# Patient Record
Sex: MALE | Race: WHITE | Employment: FULL TIME | URBAN - METROPOLITAN AREA
[De-identification: names, ages, dates, MRNs, and addresses within clinical notes are randomized per-mention and may not be internally consistent; named-entity substitution may affect disease eponyms.]

---

## 2018-10-19 ENCOUNTER — HOSPITAL ENCOUNTER (EMERGENCY)
Age: 23
Discharge: ANOTHER HOSPITAL | DRG: 430 | End: 2018-10-19
Attending: EMERGENCY MEDICINE | Admitting: PSYCHIATRY & NEUROLOGY
Payer: COMMERCIAL

## 2018-10-19 ENCOUNTER — HOSPITAL ENCOUNTER (INPATIENT)
Age: 23
LOS: 6 days | Discharge: HOME | DRG: 430 | End: 2018-10-25
Attending: PSYCHIATRY & NEUROLOGY | Admitting: PSYCHIATRY & NEUROLOGY
Payer: COMMERCIAL

## 2018-10-19 VITALS
OXYGEN SATURATION: 98 % | HEIGHT: 72 IN | SYSTOLIC BLOOD PRESSURE: 135 MMHG | TEMPERATURE: 98.5 F | HEART RATE: 82 BPM | RESPIRATION RATE: 12 BRPM | BODY MASS INDEX: 20.32 KG/M2 | DIASTOLIC BLOOD PRESSURE: 93 MMHG | WEIGHT: 150 LBS

## 2018-10-19 DIAGNOSIS — F31.13 BIPOLAR DISORDER WITH SEVERE MANIA (HCC): Primary | ICD-10-CM

## 2018-10-19 PROBLEM — F31.9 BIPOLAR 1 DISORDER (HCC): Status: ACTIVE | Noted: 2018-10-19

## 2018-10-19 LAB
ALBUMIN SERPL-MCNC: 4.7 G/DL (ref 3.5–5)
ALBUMIN/GLOB SERPL: 1.4 {RATIO} (ref 1.1–2.2)
ALP SERPL-CCNC: 74 U/L (ref 45–117)
ALT SERPL-CCNC: 18 U/L (ref 12–78)
AMPHET UR QL SCN: NEGATIVE
ANION GAP SERPL CALC-SCNC: 11 MMOL/L (ref 5–15)
APPEARANCE UR: CLEAR
AST SERPL-CCNC: 17 U/L (ref 15–37)
BACTERIA URNS QL MICRO: NEGATIVE /HPF
BARBITURATES UR QL SCN: NEGATIVE
BASOPHILS # BLD: 0 K/UL (ref 0–0.1)
BASOPHILS NFR BLD: 0 % (ref 0–1)
BENZODIAZ UR QL: NEGATIVE
BILIRUB SERPL-MCNC: 1.8 MG/DL (ref 0.2–1)
BILIRUB UR QL: NEGATIVE
BUN SERPL-MCNC: 11 MG/DL (ref 6–20)
BUN/CREAT SERPL: 9 (ref 12–20)
CALCIUM SERPL-MCNC: 9.3 MG/DL (ref 8.5–10.1)
CANNABINOIDS UR QL SCN: NEGATIVE
CHLORIDE SERPL-SCNC: 105 MMOL/L (ref 97–108)
CO2 SERPL-SCNC: 23 MMOL/L (ref 21–32)
COCAINE UR QL SCN: NEGATIVE
COLOR UR: NORMAL
CREAT SERPL-MCNC: 1.26 MG/DL (ref 0.7–1.3)
DIFFERENTIAL METHOD BLD: ABNORMAL
DRUG SCRN COMMENT,DRGCM: NORMAL
EOSINOPHIL # BLD: 0.1 K/UL (ref 0–0.4)
EOSINOPHIL NFR BLD: 1 % (ref 0–7)
EPITH CASTS URNS QL MICRO: NORMAL /LPF
ERYTHROCYTE [DISTWIDTH] IN BLOOD BY AUTOMATED COUNT: 12.8 % (ref 11.5–14.5)
ETHANOL SERPL-MCNC: <10 MG/DL
GLOBULIN SER CALC-MCNC: 3.4 G/DL (ref 2–4)
GLUCOSE SERPL-MCNC: 124 MG/DL (ref 65–100)
GLUCOSE UR STRIP.AUTO-MCNC: NEGATIVE MG/DL
HCT VFR BLD AUTO: 45.7 % (ref 36.6–50.3)
HGB BLD-MCNC: 16.5 G/DL (ref 12.1–17)
HGB UR QL STRIP: NEGATIVE
HYALINE CASTS URNS QL MICRO: NORMAL /LPF (ref 0–5)
IMM GRANULOCYTES # BLD: 0 K/UL (ref 0–0.04)
IMM GRANULOCYTES NFR BLD AUTO: 0 % (ref 0–0.5)
KETONES UR QL STRIP.AUTO: NEGATIVE MG/DL
LEUKOCYTE ESTERASE UR QL STRIP.AUTO: NEGATIVE
LYMPHOCYTES # BLD: 1.6 K/UL (ref 0.8–3.5)
LYMPHOCYTES NFR BLD: 21 % (ref 12–49)
MCH RBC QN AUTO: 28.2 PG (ref 26–34)
MCHC RBC AUTO-ENTMCNC: 36.1 G/DL (ref 30–36.5)
MCV RBC AUTO: 78.1 FL (ref 80–99)
METHADONE UR QL: NEGATIVE
MONOCYTES # BLD: 0.7 K/UL (ref 0–1)
MONOCYTES NFR BLD: 9 % (ref 5–13)
NEUTS SEG # BLD: 5.3 K/UL (ref 1.8–8)
NEUTS SEG NFR BLD: 68 % (ref 32–75)
NITRITE UR QL STRIP.AUTO: NEGATIVE
NRBC # BLD: 0 K/UL (ref 0–0.01)
NRBC BLD-RTO: 0 PER 100 WBC
OPIATES UR QL: NEGATIVE
PCP UR QL: NEGATIVE
PH UR STRIP: 6 [PH] (ref 5–8)
PLATELET # BLD AUTO: 302 K/UL (ref 150–400)
PMV BLD AUTO: 10.2 FL (ref 8.9–12.9)
POTASSIUM SERPL-SCNC: 3.3 MMOL/L (ref 3.5–5.1)
PROT SERPL-MCNC: 8.1 G/DL (ref 6.4–8.2)
PROT UR STRIP-MCNC: NEGATIVE MG/DL
RBC # BLD AUTO: 5.85 M/UL (ref 4.1–5.7)
RBC #/AREA URNS HPF: NORMAL /HPF (ref 0–5)
SODIUM SERPL-SCNC: 139 MMOL/L (ref 136–145)
SP GR UR REFRACTOMETRY: 1.01 (ref 1–1.03)
UA: UC IF INDICATED,UAUC: NORMAL
UROBILINOGEN UR QL STRIP.AUTO: 1 EU/DL (ref 0.2–1)
WBC # BLD AUTO: 7.7 K/UL (ref 4.1–11.1)
WBC URNS QL MICRO: NORMAL /HPF (ref 0–4)

## 2018-10-19 PROCEDURE — 36415 COLL VENOUS BLD VENIPUNCTURE: CPT | Performed by: EMERGENCY MEDICINE

## 2018-10-19 PROCEDURE — 96372 THER/PROPH/DIAG INJ SC/IM: CPT

## 2018-10-19 PROCEDURE — 80307 DRUG TEST PRSMV CHEM ANLYZR: CPT | Performed by: EMERGENCY MEDICINE

## 2018-10-19 PROCEDURE — 81001 URINALYSIS AUTO W/SCOPE: CPT | Performed by: EMERGENCY MEDICINE

## 2018-10-19 PROCEDURE — 85025 COMPLETE CBC W/AUTO DIFF WBC: CPT | Performed by: EMERGENCY MEDICINE

## 2018-10-19 PROCEDURE — 65270000029 HC RM PRIVATE

## 2018-10-19 PROCEDURE — 65220000003 HC RM SEMIPRIVATE PSYCH

## 2018-10-19 PROCEDURE — 74011250636 HC RX REV CODE- 250/636: Performed by: EMERGENCY MEDICINE

## 2018-10-19 PROCEDURE — 99284 EMERGENCY DEPT VISIT MOD MDM: CPT

## 2018-10-19 PROCEDURE — 80053 COMPREHEN METABOLIC PANEL: CPT | Performed by: EMERGENCY MEDICINE

## 2018-10-19 PROCEDURE — 74011250637 HC RX REV CODE- 250/637: Performed by: PSYCHIATRY & NEUROLOGY

## 2018-10-19 RX ORDER — LORAZEPAM 2 MG/ML
2 INJECTION INTRAMUSCULAR
Status: CANCELLED | OUTPATIENT
Start: 2018-10-19

## 2018-10-19 RX ORDER — OLANZAPINE 5 MG/1
5 TABLET ORAL
Status: CANCELLED | OUTPATIENT
Start: 2018-10-19

## 2018-10-19 RX ORDER — IBUPROFEN 200 MG
1 TABLET ORAL
Status: CANCELLED | OUTPATIENT
Start: 2018-10-19

## 2018-10-19 RX ORDER — IBUPROFEN 400 MG/1
400 TABLET ORAL
Status: DISCONTINUED | OUTPATIENT
Start: 2018-10-19 | End: 2018-10-25 | Stop reason: HOSPADM

## 2018-10-19 RX ORDER — LORAZEPAM 1 MG/1
1 TABLET ORAL
Status: CANCELLED | OUTPATIENT
Start: 2018-10-19

## 2018-10-19 RX ORDER — OLANZAPINE 5 MG/1
5 TABLET ORAL
Status: DISCONTINUED | OUTPATIENT
Start: 2018-10-19 | End: 2018-10-25 | Stop reason: HOSPADM

## 2018-10-19 RX ORDER — ZOLPIDEM TARTRATE 10 MG/1
10 TABLET ORAL
Status: CANCELLED | OUTPATIENT
Start: 2018-10-19

## 2018-10-19 RX ORDER — ADHESIVE BANDAGE
30 BANDAGE TOPICAL DAILY PRN
Status: CANCELLED | OUTPATIENT
Start: 2018-10-19

## 2018-10-19 RX ORDER — BENZTROPINE MESYLATE 1 MG/ML
2 INJECTION INTRAMUSCULAR; INTRAVENOUS
Status: DISCONTINUED | OUTPATIENT
Start: 2018-10-19 | End: 2018-10-25 | Stop reason: HOSPADM

## 2018-10-19 RX ORDER — HALOPERIDOL 5 MG/ML
5 INJECTION INTRAMUSCULAR
Status: COMPLETED | OUTPATIENT
Start: 2018-10-19 | End: 2018-10-19

## 2018-10-19 RX ORDER — LORAZEPAM 2 MG/ML
2 INJECTION INTRAMUSCULAR
Status: DISCONTINUED | OUTPATIENT
Start: 2018-10-19 | End: 2018-10-25 | Stop reason: HOSPADM

## 2018-10-19 RX ORDER — LORAZEPAM 1 MG/1
1 TABLET ORAL
Status: DISCONTINUED | OUTPATIENT
Start: 2018-10-19 | End: 2018-10-25 | Stop reason: HOSPADM

## 2018-10-19 RX ORDER — ACETAMINOPHEN 325 MG/1
650 TABLET ORAL
Status: DISCONTINUED | OUTPATIENT
Start: 2018-10-19 | End: 2018-10-25 | Stop reason: HOSPADM

## 2018-10-19 RX ORDER — ZOLPIDEM TARTRATE 10 MG/1
10 TABLET ORAL
Status: DISCONTINUED | OUTPATIENT
Start: 2018-10-19 | End: 2018-10-25 | Stop reason: HOSPADM

## 2018-10-19 RX ORDER — BENZTROPINE MESYLATE 2 MG/1
2 TABLET ORAL
Status: DISCONTINUED | OUTPATIENT
Start: 2018-10-19 | End: 2018-10-25 | Stop reason: HOSPADM

## 2018-10-19 RX ORDER — BENZTROPINE MESYLATE 2 MG/1
2 TABLET ORAL
Status: CANCELLED | OUTPATIENT
Start: 2018-10-19

## 2018-10-19 RX ORDER — ADHESIVE BANDAGE
30 BANDAGE TOPICAL DAILY PRN
Status: DISCONTINUED | OUTPATIENT
Start: 2018-10-19 | End: 2018-10-25 | Stop reason: HOSPADM

## 2018-10-19 RX ORDER — ACETAMINOPHEN 325 MG/1
650 TABLET ORAL
Status: CANCELLED | OUTPATIENT
Start: 2018-10-19

## 2018-10-19 RX ORDER — BENZTROPINE MESYLATE 1 MG/ML
2 INJECTION INTRAMUSCULAR; INTRAVENOUS
Status: CANCELLED | OUTPATIENT
Start: 2018-10-19

## 2018-10-19 RX ORDER — IBUPROFEN 200 MG
1 TABLET ORAL
Status: DISCONTINUED | OUTPATIENT
Start: 2018-10-19 | End: 2018-10-25 | Stop reason: HOSPADM

## 2018-10-19 RX ORDER — IBUPROFEN 400 MG/1
400 TABLET ORAL
Status: CANCELLED | OUTPATIENT
Start: 2018-10-19

## 2018-10-19 RX ADMIN — ZOLPIDEM TARTRATE 10 MG: 10 TABLET ORAL at 21:13

## 2018-10-19 RX ADMIN — HALOPERIDOL LACTATE 5 MG: 5 INJECTION, SOLUTION INTRAMUSCULAR at 11:49

## 2018-10-19 NOTE — ED NOTES
Pt arrived under ECO order. Pt  Unable to be redirected and orders placed for IM Haldol 5mg. Not making complete thoughts, Pupils dilated. Pt states \" everyone must look me in the eye. \" \" Do you know my mom? \" \" I went to [facility]. \" Officers present and pt placed in paper scrubs, personal belongings placed in bag and harmful items removed from room.

## 2018-10-19 NOTE — ED PROVIDER NOTES
EMERGENCY DEPARTMENT HISTORY AND PHYSICAL EXAM 
 
 
Date: 10/19/2018 Patient Name: Camilla Holt History of Presenting Illness Chief Complaint Patient presents with  Mental Health Problem The patient presents to the ED via Police custody, after his mother called him to check on him and realized that he appeared to be in a manic state. Patient has a history of bipolar disorder, mother states that she believes that the patient has not been taking his medications. History Provided By: Patient and Police HPI: Camilla Holt, 26 y.o. male with PMHx significant for bipolar disorder, presents under ECO escorted by Police to the ED for mental health evaluation. Per EMS, his mother called pt today and he appeared to be in a manic state. His mother called the police because she was concerned for his safety and believes pt has not been taking his medication. Of note, his family is from [place] and would like to take him back there to be treated. Hx and ROS otherwise limited given pt is agitated and aggressive. Past History Past Medical History: 
Past Medical History:  
Diagnosis Date  Bipolar 1 disorder ([facility] [address] Past Surgical History: 
History reviewed. No pertinent surgical history. Family History: 
History reviewed. No pertinent family history. Social History: 
Social History Tobacco Use  Smoking status: Current Every Day Smoker Substance Use Topics  Alcohol use: Yes  Drug use: Yes Comment: did not specify Allergies: 
No Known Allergies Review of Systems Review of Systems Unable to perform ROS: Psychiatric disorder Physical Exam  
Physical Exam  
Constitutional: He is oriented to person, place, and time. He appears well-developed and well-nourished. No distress. HENT:  
Head: Normocephalic and atraumatic.   
Mouth/Throat: Oropharynx is clear and moist.  
Eyes: Conjunctivae and EOM are normal.  
 Neck: Neck supple. No JVD present. No tracheal deviation present. Cardiovascular: Normal rate, regular rhythm and intact distal pulses. Exam reveals no gallop and no friction rub. No murmur heard. Pulmonary/Chest: Effort normal and breath sounds normal. No stridor. No respiratory distress. He has no wheezes. Abdominal: Soft. Bowel sounds are normal. He exhibits no distension and no mass. There is no tenderness. There is no guarding. Musculoskeletal: Normal range of motion. He exhibits no edema or tenderness. No deformity Neurological: He is alert and oriented to person, place, and time. He has normal strength. No focal deficits Skin: Skin is warm, dry and intact. No rash noted. Psychiatric: His mood appears anxious. He is agitated and aggressive. Nursing note and vitals reviewed. Diagnostic Study Results Labs - Recent Results (from the past 12 hour(s)) METABOLIC PANEL, COMPREHENSIVE Collection Time: 10/19/18 12:12 PM  
Result Value Ref Range Sodium 139 136 - 145 mmol/L Potassium 3.3 (L) 3.5 - 5.1 mmol/L Chloride 105 97 - 108 mmol/L  
 CO2 23 21 - 32 mmol/L Anion gap 11 5 - 15 mmol/L Glucose 124 (H) 65 - 100 mg/dL BUN 11 6 - 20 MG/DL Creatinine 1.26 0.70 - 1.30 MG/DL  
 BUN/Creatinine ratio 9 (L) 12 - 20 GFR est AA >60 >60 ml/min/1.73m2 GFR est non-AA >60 >60 ml/min/1.73m2 Calcium 9.3 8.5 - 10.1 MG/DL Bilirubin, total 1.8 (H) 0.2 - 1.0 MG/DL  
 ALT (SGPT) 18 12 - 78 U/L  
 AST (SGOT) 17 15 - 37 U/L Alk. phosphatase 74 45 - 117 U/L Protein, total 8.1 6.4 - 8.2 g/dL Albumin 4.7 3.5 - 5.0 g/dL Globulin 3.4 2.0 - 4.0 g/dL A-G Ratio 1.4 1.1 - 2.2    
CBC WITH AUTOMATED DIFF Collection Time: 10/19/18 12:12 PM  
Result Value Ref Range WBC 7.7 4.1 - 11.1 K/uL  
 RBC 5.85 (H) 4.10 - 5.70 M/uL  
 HGB 16.5 12.1 - 17.0 g/dL HCT 45.7 36.6 - 50.3 %  MCV 78.1 (L) 80.0 - 99.0 FL  
 MCH 28.2 26.0 - 34.0 PG  
 MCHC 36.1 30.0 - 36.5 g/dL  
 RDW 12.8 11.5 - 14.5 % PLATELET 318 050 - 175 K/uL MPV 10.2 8.9 - 12.9 FL  
 NRBC 0.0 0  WBC ABSOLUTE NRBC 0.00 0.00 - 0.01 K/uL NEUTROPHILS 68 32 - 75 % LYMPHOCYTES 21 12 - 49 % MONOCYTES 9 5 - 13 % EOSINOPHILS 1 0 - 7 % BASOPHILS 0 0 - 1 % IMMATURE GRANULOCYTES 0 0.0 - 0.5 % ABS. NEUTROPHILS 5.3 1.8 - 8.0 K/UL  
 ABS. LYMPHOCYTES 1.6 0.8 - 3.5 K/UL  
 ABS. MONOCYTES 0.7 0.0 - 1.0 K/UL  
 ABS. EOSINOPHILS 0.1 0.0 - 0.4 K/UL  
 ABS. BASOPHILS 0.0 0.0 - 0.1 K/UL  
 ABS. IMM. GRANS. 0.0 0.00 - 0.04 K/UL  
 DF AUTOMATED    
ETHYL ALCOHOL Collection Time: 10/19/18 12:12 PM  
Result Value Ref Range ALCOHOL(ETHYL),SERUM <10 <10 MG/DL  
DRUG SCREEN, URINE Collection Time: 10/19/18  1:22 PM  
Result Value Ref Range AMPHETAMINES NEGATIVE  NEG    
 BARBITURATES NEGATIVE  NEG BENZODIAZEPINES NEGATIVE  NEG    
 COCAINE NEGATIVE  NEG METHADONE NEGATIVE  NEG    
 OPIATES NEGATIVE  NEG    
 PCP(PHENCYCLIDINE) NEGATIVE  NEG    
 THC (TH-CANNABINOL) NEGATIVE  NEG Drug screen comment (NOTE) URINALYSIS W/ REFLEX CULTURE Collection Time: 10/19/18  1:22 PM  
Result Value Ref Range Color DARK YELLOW Appearance CLEAR CLEAR Specific gravity 1.013 1.003 - 1.030    
 pH (UA) 6.0 5.0 - 8.0 Protein NEGATIVE  NEG mg/dL Glucose NEGATIVE  NEG mg/dL Ketone NEGATIVE  NEG mg/dL Bilirubin NEGATIVE  NEG Blood NEGATIVE  NEG Urobilinogen 1.0 0.2 - 1.0 EU/dL Nitrites NEGATIVE  NEG Leukocyte Esterase NEGATIVE  NEG    
 WBC 0-4 0 - 4 /hpf  
 RBC 0-5 0 - 5 /hpf Epithelial cells FEW FEW /lpf Bacteria NEGATIVE  NEG /hpf  
 UA:UC IF INDICATED CULTURE NOT INDICATED BY UA RESULT CNI Hyaline cast 2-5 0 - 5 /lpf Radiologic Studies - No orders to display Medical Decision Making I am the first provider for this patient.  
 
I reviewed the vital signs, available nursing notes, past medical history, past surgical history, family history and social history. Vital Signs-Reviewed the patient's vital signs. Patient Vitals for the past 12 hrs: 
 Temp Pulse Resp BP SpO2  
10/19/18 1551 98.5 °F (36.9 °C) 82 12 (!) 135/93 98 % 10/19/18 1317  88 16 (!) 137/93 98 % 10/19/18 1154 98.1 °F (36.7 °C) (!) 152 (!) 32 (!) 150/99 98 % Pulse Oximetry Analysis - 98% on RA Records Reviewed: Nursing Notes Provider Notes (Medical Decision Making): Pt presents under ECO. Pt very combative and aggressive towards staff. Will tx with Haldol. Crisis at bedside. Will dispo per Crisis. ED Course:  
Initial assessment performed. The patients presenting problems have been discussed, and they are in agreement with the care plan formulated and outlined with them. I have encouraged them to ask questions as they arise throughout their visit. 11:46 AM 
Pt is being combative and agressive towards staff. Will order Haldol. 12:06 PM 
Crisis at bedside waiting for family to get there.  
 
1:01 PM 
Spoke with Crisis. Pt will now be a TDO.  
 
1:40 PM 
Pt is medically cleared. 3:15 PM 
Pt has been accepted by Dr. Reena Mendoza - psychiatry at [facility]. SIGN OUT: 
3:25 PM 
Patient's presentation, labs/imaging and plan of care was reviewed with Farhan Kent MD as part of sign out. They will sign the EMTALA for transfer as part of the plan discussed with the patient. Farhan Kent MD's assistance in completion of this plan is greatly appreciated but it should be noted that I will be the provider of record for this patient. Dustin Brooks,  
 
05:53PM 
LAST LOOK: 
 Temp Pulse Resp BP SpO2  
10/19/18 1551 98.5 °F (36.9 °C) 82 12 (!) 135/93 98 % Just prior to transfer, I re-evaluated the patient. Pertinent physical exam is normal. Vitals reviewed and patient/family given a chance to ask questions. All agree with the plan to transfer. At this time, I feel that Chadwick Perea is stable for transfer. Written by Richa Mujica ED Scribe as dictated by Carlos Rojas. Hamida Campbell MD 
 
CRITICAL CARE NOTE:2:16 PM 
IMPENDING DETERIORATION -CNS 
ASSOCIATED RISK FACTORS - CNS Decompensation MANAGEMENT- Bedside Assessment and Supervision of Care and Transfer INTERPRETATION -  Blood Pressure, Labs INTERVENTIONS - Neurologic interventions CASE REVIEW - Medical Sub-Specialist and Nursing TREATMENT RESPONSE -Improved PERFORMED BY - Self NOTES   : 
I have spent 30 minutes of critical care time involved in lab review, consultations with specialist, family decision- making, bedside attention and documentation. During this entire length of time I was immediately available to the patient. PLAN: 
1. Transfer to [facility] Transfer Note:  
3:20 PM 
Patient is being transferred to [facility]. Transfer was accepted by Dr. Yelena Shelley. The reasons for their transfer have been discussed with them and available family. They convey agreement and understanding for the need to be transferred as explained to them. Diagnosis Clinical Impression: 1. Bipolar disorder with severe karel ([address] Attestations: This note is prepared by Florecita Mercedes, acting as Scribe for Yoni Montalvo DO. The scribe's documentation has been prepared under my direction and personally reviewed by me in its entirety. I confirm that the note above accurately reflects all work, treatment, procedures, and medical decision making performed by me.  
Yoni Montalvo DO

## 2018-10-19 NOTE — BH NOTES
Pt arrived to unit at 6:00 pm and was escorted by security. Pt was cooperative with the admission process with answering questions. Pt did take frequent seconds to answer the questions, but for most of the questions he was able to give an answer for. Some questions he didn't remember or was having difficulty gathering a response. Pt had a normal tone and rhythm verbally. Pt had no odor and appeared upkept Pt stated he is at times SI, but doesn't have a plan to follow through. He denies long-term medical conditions, but did state he had a open heart surgery. A scar is on his chest but difficult to see due to the hair on his chest. Pt states he smokes marijuana and cigarettes and drink alcohol socially. Pt didn't know if he had been abused in the past, but denied currently being abused. Pt states he is currently unemployed and just left job willingly. Pt was shown the different areas on the unit and walked through a normal day on the unit. Questions were encouraged and answered appropriately. Dietary was called and dinner tray will be sent to unit for pt.

## 2018-10-20 PROCEDURE — 74011250637 HC RX REV CODE- 250/637: Performed by: PSYCHIATRY & NEUROLOGY

## 2018-10-20 PROCEDURE — 90686 IIV4 VACC NO PRSV 0.5 ML IM: CPT | Performed by: PSYCHIATRY & NEUROLOGY

## 2018-10-20 PROCEDURE — 65220000003 HC RM SEMIPRIVATE PSYCH

## 2018-10-20 PROCEDURE — 74011250636 HC RX REV CODE- 250/636: Performed by: PSYCHIATRY & NEUROLOGY

## 2018-10-20 PROCEDURE — 90471 IMMUNIZATION ADMIN: CPT

## 2018-10-20 RX ORDER — QUETIAPINE FUMARATE 25 MG/1
25 TABLET, FILM COATED ORAL
Status: DISCONTINUED | OUTPATIENT
Start: 2018-10-20 | End: 2018-10-22

## 2018-10-20 RX ADMIN — INFLUENZA VIRUS VACCINE 0.5 ML: 15; 15; 15; 15 SUSPENSION INTRAMUSCULAR at 09:54

## 2018-10-20 RX ADMIN — QUETIAPINE FUMARATE 25 MG: 25 TABLET ORAL at 21:57

## 2018-10-20 RX ADMIN — ZOLPIDEM TARTRATE 10 MG: 10 TABLET ORAL at 21:35

## 2018-10-20 RX ADMIN — BENZTROPINE MESYLATE 2 MG: 1 INJECTION INTRAMUSCULAR; INTRAVENOUS at 11:40

## 2018-10-20 RX ADMIN — LORAZEPAM 2 MG: 2 INJECTION INTRAMUSCULAR; INTRAVENOUS at 11:40

## 2018-10-20 NOTE — PROGRESS NOTES
Problem: Psychosis Goal: *STG/LTG: Complies with medication therapy Outcome: Progressing Towards Goal 
Patient visible on the unit out in the milieu. Meal and medication compliant. Flu vaccine administered. PRN Cogentin and Ativan administered for dystonic like neck contortions. Tremors noted. Bizarre. No complaints of pain. Delayed response to questions. No major behavioral issues noted. Continues on q15 min safety checks. Will continue to monitor for changes in physical and mental health and continue plan of care.

## 2018-10-20 NOTE — CONSULTS
Medical Consult for [facility] Patient    Consult H&P   dictated, see patient chart    Impression:    Calli Mtz a 26 y.o. male with no past medical history presents with behavioral health problems of psychosis admitted for further psychiatric evaluation and treatment. Plan:   1. Psychiatry to manage mental health issues. 2. Medically stable at this time, will follow up as needed. 3. No VTE prophylaxis indicated or necessary at this time.      Thank you  Micah Holstein, MD  10/20/2018, 6:08 PM

## 2018-10-20 NOTE — PROGRESS NOTES
Problem: Psychosis Goal: *STG/LTG: Demonstrates improved thought patterns as evidenced by logical and coherent speech Outcome: Progressing Towards Goal 
Pt rested quietly in bed with eyes closed. No signs/symptoms of distress, agitation, or anxiety. Will monitor for changes. Q 15 minute checks continue. PT slept 6 hrs

## 2018-10-20 NOTE — BH NOTES
Spoke with mother of patient. Mother states that she found a backpack with medication in it in his car and also \"odd artwork and his journal\" that she is bringing today with her for visit. Medication found in car was Celexa 20 mg daily prescribed by Dr. Mariposa Candelario in [place] [phone].

## 2018-10-20 NOTE — PROGRESS NOTES
Pnt verbal alert and oriented. RR even and unlabored. Pnt ambulatory from room to dayroom w/o difficulty. No agitation or aggressive behaviors noted. Denies SI/HI. Pnt in dayroom visiting with family. No other complaints voiced @ this time. Pnt stable. Will continue to monitor for pnt safety.

## 2018-10-20 NOTE — BH NOTES
PRN Cogentin and Ativan administered IM for neck  Hyperextension and stiffness and possible EPS symptoms and anxiety.

## 2018-10-21 PROBLEM — F29 PSYCHOSIS (HCC): Status: ACTIVE | Noted: 2018-10-21

## 2018-10-21 LAB — POTASSIUM SERPL-SCNC: 3.7 MMOL/L (ref 3.5–5.1)

## 2018-10-21 PROCEDURE — 65220000003 HC RM SEMIPRIVATE PSYCH

## 2018-10-21 PROCEDURE — 84132 ASSAY OF SERUM POTASSIUM: CPT | Performed by: FAMILY MEDICINE

## 2018-10-21 PROCEDURE — 74011250637 HC RX REV CODE- 250/637: Performed by: PSYCHIATRY & NEUROLOGY

## 2018-10-21 PROCEDURE — 36415 COLL VENOUS BLD VENIPUNCTURE: CPT | Performed by: FAMILY MEDICINE

## 2018-10-21 RX ADMIN — LORAZEPAM 1 MG: 1 TABLET ORAL at 19:00

## 2018-10-21 RX ADMIN — OLANZAPINE 5 MG: 5 TABLET, FILM COATED ORAL at 19:00

## 2018-10-21 NOTE — PROGRESS NOTES
Problem: Psychosis Goal: *STG/LTG: Complies with medication therapy Outcome: Progressing Towards Goal 
Patient denies any HI/SI. Compliant with meals and medications. No behavioral issues at this time.

## 2018-10-21 NOTE — PROGRESS NOTES
Spiritual Care Assessment/Progress Note [facility] 
 
 
NAME: Lacey Callaway      MRN: [id number] AGE: 26 y.o. SEX: male Nondenominational Affiliation: No preference Language: English  
 
10/21/2018     Total Time (in minutes): 12 Spiritual Assessment begun in [place] [address] [facility] through conversation with: 
  
    []Patient        [] Family    [] Friend(s) Reason for Consult: Advance medical directive consult Spiritual beliefs: (Please include comment if needed) 
   [] Identifies with a zan tradition:     
   [] Supported by a zan community:        
   [] Claims no spiritual orientation:       
   [] Seeking spiritual identity:            
   [] Adheres to an individual form of spirituality:       
   [x] Not able to assess:                   
 
    
Identified resources for coping:  
   [] Prayer                           
   [] Music                  [] Guided Imagery 
   [] Family/friends                 [] Pet visits [] Devotional reading                         [x] Unknown 
   [] Other:                                       
 
 
Interventions offered during this visit: (See comments for more details) Patient Interventions: Other (comment)(Consult with Janiya Canseco RN by phone) Plan of Care: 
 
 [] Support spiritual and/or cultural needs  
 [] Support AMD and/or advance care planning process    
 [] Support grieving process 
 [] Coordinate Rites and/or Rituals  
 [] Coordination with community clergy [] No spiritual needs identified at this time 
 [] Detailed Plan of Care below (See Comments)  [] Make referral to Music Therapy 
[] Make referral to Pet Therapy    
[] Make referral to Addiction services 
[] Make referral to [facility] 
[] Make referral to Spiritual Care Partner 
[] No future visits requested       
[x] Follow up visits as needed Comments:  Received In Basket request for Advance Medical Directive (AMD) consult. Consulted with Amee Pantoja RN by telephone. Because patient is admitted on TDO, he is not appropriate for consultation at this time. Kasandra Melendez, MPS, [address] Paging Service  [phone])

## 2018-10-21 NOTE — CONSULTS
[address] [facility]   MR#: [id number]  : 1995  ACCOUNT #: [de-identified]   DATE OF SERVICE: 10/20/2018    REFERRING PHYSICIAN:  Beto Avila MD    REASON FOR CONSULTATION:  Medical evaluation for psychiatric admission. CHIEF COMPLAINT:  Psychosis. HISTORY OF PRESENT ILLNESS:  This is a 45-year-old male who presents psychotic requiring further psychiatric evaluation and treatment. The patient says he was en route from [facility] to visit his family in [facility] and was noted to be psychotic and was admitted for further psychiatric evaluation and treatment. He denies any chest pain, shortness of breath, nausea, vomiting or diarrhea. Denies any history of mental health admission in the past.    PAST MEDICAL HISTORY:  Congenital atrial septal defect. PAST SURGICAL HISTORY:  ASD correction as a child. ALLERGIES:  NONE TO MEDICATIONS. MEDICATIONS:  Celexa, unknown dose. SOCIAL HISTORY:  Does occasionally smoke cigarettes. Says he drinks 3-4 times per week beer and other alcohol use. Does socially use of marijuana. Denies any cocaine or heroin use. Single, has no kids. Says he is a recent graduate of [employer]. PHYSICAL EXAMINATION:  VITAL SIGNS:  Temperature 98.1, blood pressure 147/96, pulse 102, respirations 18, pulse ox 99%. GENERAL:  Pleasant, healthy appearing, no acute distress. HEENT:  Oropharynx is clear. NECK:  Supple. LUNGS:  Clear to auscultation. No wheezing, rales or rhonchi. CARDIOVASCULAR:  Regular rate. No murmurs, gallops or rubs. Chest has a well-healed vertical scar from cardiac surgery. ABDOMEN:  Soft, nontender, nondistended. Normoactive bowel sounds. No hepatosplenomegaly. EXTREMITIES:  No cyanosis, clubbing, edema. LABORATORY DATA:  Hemoglobin 16.5, hematocrit is 45.7. Sodium 139, potassium 3.3, chloride 105, bicarbonate 23, BUN is 11, creatinine is 1.26, glucose 124. Tox screen is negative. Alcohol level less than 10. UA was negative. ASSESSMENT:  This is a 71-year-old male with a history of congenital heart disease, presents with psychosis, admitted for further psychiatric evaluation and treatment. PLAN:  1. Psychiatric management of health issues. 2.  Recheck potassium, replete as indicated. 3.  Medically stable. Follow up on a p.r.n. basis. 4.  No VTE prophylaxis indicated or warranted at this time. 5.  Patient's blood pressure mildly elevated, likely secondary to being in a new environment. We will observe and treat accordingly during his hospitalization. The patient does not have a history of hypertension.       Chi Marin MD DC / Neli.Patrick  D: 10/21/2018 08:32     T: 10/21/2018 10:30  JOB #: [id number]

## 2018-10-21 NOTE — ACP (ADVANCE CARE PLANNING)
Received In [employer] request for Advance Medical Directive (AMD) consult. Consulted with Shayan Sofia RN by telephone. Because patient is admitted on TDO, he is not appropriate for consultation at this time.     Cassondra Riedel, MPS, [facility], [address] [place] [address] Avenue    [address] [place] [address] Road Paging Service  [phone])

## 2018-10-21 NOTE — H&P
INITIAL PSYCHIATRIC EVALUATION   
   
 
IDENTIFICATION:   
Patient Name  Martina Mcmahon Date of Birth 1995 [facility] [id number] Medical Record Number  [id number] Age  26 y.o. PCP None Admit date:  10/19/2018 Room Number  429/21  @ [facility]  
Date of Service  10/20/2018 HISTORY  
 
   
REASON FOR HOSPITALIZATION: 
CC:  Pt admitted under a temporary senior care order (TDO) with severe psychosis  proving to be an imminent danger to self. HISTORY OF PRESENT ILLNESS:   
The patient, Martina Mcmahon, is a 26 y.o. WHITE [place]  male with a past psychiatric history significant for ? Bipolar D/O, who presents at this time with complaints of (and/or evidence of) the following emotional symptoms: delusions, psychotic behavior, anxiety and psychosis. Additional symptomatology include anxiety and fear of dying. The above symptoms have been present for 3 days. These symptoms are of high severity. These symptoms are constant in nature. The patient's condition has been precipitated by multiple psychosocial stressors. Patient's condition made worse by treatment noncompliance. UDS:negative; BAL=0. [facility] [place] to his family. He was on the phone with his grandfather who found him to be very delusional and facilitated his coming to [facility]. Pt presented very disorganized, anxious, bizarre. He has not slept for 3 days. He stated that on the way he wanted to stop by in [place] to visit his father's grave who  8 yrs ago in the month of October. His mother had called to report, she found a bottle of Celexa pills in his car. Pt denies any SI/HI. Pt reportedly is also stressed out from his new job. ALLERGIES: No Known Allergies MEDICATIONS PRIOR TO ADMISSION:  
No medications prior to admission. PAST MEDICAL HISTORY:  
Past Medical History:  
Diagnosis Date  Bipolar 1 disorder ([address] No past surgical history on file. SOCIAL HISTORY: Per  note Social History Socioeconomic History  Marital status: SINGLE Spouse name: Not on file  Number of children: Not on file  Years of education: Not on file  Highest education level: Not on file Social Needs  Financial resource strain: Not on file  Food insecurity - worry: Not on file  Food insecurity - inability: Not on file  Transportation needs - medical: Not on file  Transportation needs - non-medical: Not on file Occupational History  Not on file Tobacco Use  Smoking status: Current Every Day Smoker Substance and Sexual Activity  Alcohol use: Yes  Drug use: Yes Comment: did not specify  Sexual activity: Not on file Other Topics Concern  Not on file Social History Narrative  Not on file FAMILY HISTORY: History reviewed. No pertinent family history. No family history on file. REVIEW OF SYSTEMS:  
Psychological ROS: positive for - anxiety, depression and sleep disturbances 
negative for - hallucinations or suicidal ideation Pertinent items are noted in the History of Present Illness. All other Systems reviewed and are considered negative. [facility] MENTAL STATUS EXAM (MSE): MSE FINDINGS ARE WITHIN NORMAL LIMITS (WNL) UNLESS OTHERWISE STATED BELOW. ( ALL OF THE BELOW CATEGORIES OF THE MSE HAVE BEEN REVIEWED (reviewed 10/20/2018) AND UPDATED AS DEEMED APPROPRIATE ) General Presentation age appropriate and casually dressed, cooperative, guarded and vague Orientation disorganized, oriented to time, place and person Vital Signs  See below (reviewed 10/20/2018); Vital Signs (BP, Pulse, & Temp) are within normal limits if not listed below. Gait and Station Stable/steady, no ataxia Musculoskeletal System No extrapyramidal symptoms (EPS); no abnormal muscular movements or Tardive Dyskinesia (TD); muscle strength and tone are within normal limits Language No aphasia or dysarthria Speech:  normal pitch and incoherent Thought Processes illogical; slow rate of thoughts; poor abstract reasoning/computation Thought Associations blocked  and loose associations Thought Content bizarre delusions and internally preoccupied Suicidal Ideations none Homicidal Ideations none Mood:  anxious Affect:  labile Memory recent  impaired Memory remote:  fair Concentration/Attention:  distractable Fund of Knowledge average Insight:  poor Reliability poor Judgment:  fair VITALS:    
Patient Vitals for the past 24 hrs: 
 Temp Pulse Resp BP SpO2  
10/20/18 2129 98.2 °F (36.8 °C) 82 16 131/85   
10/20/18 1643 98.1 °F (36.7 °C) (!) 102  (!) 147/96 99 % Wt Readings from Last 3 Encounters:  
10/19/18 68 kg (150 lb) Temp Readings from Last 3 Encounters:  
10/20/18 98.2 °F (36.8 °C)  
10/19/18 98.5 °F (36.9 °C) BP Readings from Last 3 Encounters:  
10/20/18 131/85  
10/19/18 (!) 135/93 Pulse Readings from Last 3 Encounters:  
10/20/18 82  
10/19/18 82 DATA LABORATORY DATA: 
Labs Reviewed - No data to display Admission on 10/19/2018, Discharged on 10/19/2018 Component Date Value Ref Range Status  Sodium 10/19/2018 139  136 - 145 mmol/L Final  
 Potassium 10/19/2018 3.3* 3.5 - 5.1 mmol/L Final  
 Chloride 10/19/2018 105  97 - 108 mmol/L Final  
 CO2 10/19/2018 23  21 - 32 mmol/L Final  
 Anion gap 10/19/2018 11  5 - 15 mmol/L Final  
 Glucose 10/19/2018 124* 65 - 100 mg/dL Final  
 BUN 10/19/2018 11  6 - 20 MG/DL Final  
 Creatinine 10/19/2018 1.26  0.70 - 1.30 MG/DL Final  
 BUN/Creatinine ratio 10/19/2018 9* 12 - 20   Final  
 GFR est AA 10/19/2018 >60  >60 ml/min/1.73m2 Final  
 GFR est non-AA 10/19/2018 >60  >60 ml/min/1.73m2 Final  
 Calcium 10/19/2018 9.3  8.5 - 10.1 MG/DL Final  
 Bilirubin, total 10/19/2018 1.8* 0.2 - 1.0 MG/DL Final  
 ALT (SGPT) 10/19/2018 18  12 - 78 U/L Final  
  AST (SGOT) 10/19/2018 17  15 - 37 U/L Final  
 Alk. phosphatase 10/19/2018 74  45 - 117 U/L Final  
 Protein, total 10/19/2018 8.1  6.4 - 8.2 g/dL Final  
 Albumin 10/19/2018 4.7  3.5 - 5.0 g/dL Final  
 Globulin 10/19/2018 3.4  2.0 - 4.0 g/dL Final  
 A-G Ratio 10/19/2018 1.4  1.1 - 2.2   Final  
 WBC 10/19/2018 7.7  4.1 - 11.1 K/uL Final  
 RBC 10/19/2018 5.85* 4.10 - 5.70 M/uL Final  
 HGB 10/19/2018 16.5  12.1 - 17.0 g/dL Final  
 HCT 10/19/2018 45.7  36.6 - 50.3 % Final  
 MCV 10/19/2018 78.1* 80.0 - 99.0 FL Final  
 MCH 10/19/2018 28.2  26.0 - 34.0 PG Final  
 MCHC 10/19/2018 36.1  30.0 - 36.5 g/dL Final  
 RDW 10/19/2018 12.8  11.5 - 14.5 % Final  
 PLATELET 71/99/4804 904  150 - 400 K/uL Final  
 MPV 10/19/2018 10.2  8.9 - 12.9 FL Final  
 NRBC 10/19/2018 0.0  0  WBC Final  
 ABSOLUTE NRBC 10/19/2018 0.00  0.00 - 0.01 K/uL Final  
 NEUTROPHILS 10/19/2018 68  32 - 75 % Final  
 LYMPHOCYTES 10/19/2018 21  12 - 49 % Final  
 MONOCYTES 10/19/2018 9  5 - 13 % Final  
 EOSINOPHILS 10/19/2018 1  0 - 7 % Final  
 BASOPHILS 10/19/2018 0  0 - 1 % Final  
 IMMATURE GRANULOCYTES 10/19/2018 0  0.0 - 0.5 % Final  
 ABS. NEUTROPHILS 10/19/2018 5.3  1.8 - 8.0 K/UL Final  
 ABS. LYMPHOCYTES 10/19/2018 1.6  0.8 - 3.5 K/UL Final  
 ABS. MONOCYTES 10/19/2018 0.7  0.0 - 1.0 K/UL Final  
 ABS. EOSINOPHILS 10/19/2018 0.1  0.0 - 0.4 K/UL Final  
 ABS. BASOPHILS 10/19/2018 0.0  0.0 - 0.1 K/UL Final  
 ABS. IMM. GRANS.  10/19/2018 0.0  0.00 - 0.04 K/UL Final  
 DF 10/19/2018 AUTOMATED    Final  
 AMPHETAMINES 10/19/2018 NEGATIVE   NEG   Final  
 BARBITURATES 10/19/2018 NEGATIVE   NEG   Final  
 BENZODIAZEPINES 10/19/2018 NEGATIVE   NEG   Final  
 COCAINE 10/19/2018 NEGATIVE   NEG   Final  
 METHADONE 10/19/2018 NEGATIVE   NEG   Final  
 OPIATES 10/19/2018 NEGATIVE   NEG   Final  
 PCP(PHENCYCLIDINE) 10/19/2018 NEGATIVE   NEG   Final  
  THC (TH-CANNABINOL) 10/19/2018 NEGATIVE   NEG   Final  
 Drug screen comment 10/19/2018 (NOTE)   Final  
 Color 10/19/2018 DARK YELLOW    Final  
 Appearance 10/19/2018 CLEAR  CLEAR   Final  
 Specific gravity 10/19/2018 1.013  1.003 - 1.030   Final  
 pH (UA) 10/19/2018 6.0  5.0 - 8.0   Final  
 Protein 10/19/2018 NEGATIVE   NEG mg/dL Final  
 Glucose 10/19/2018 NEGATIVE   NEG mg/dL Final  
 Ketone 10/19/2018 NEGATIVE   NEG mg/dL Final  
 Bilirubin 10/19/2018 NEGATIVE   NEG   Final  
 Blood 10/19/2018 NEGATIVE   NEG   Final  
 Urobilinogen 10/19/2018 1.0  0.2 - 1.0 EU/dL Final  
 Nitrites 10/19/2018 NEGATIVE   NEG   Final  
 Leukocyte Esterase 10/19/2018 NEGATIVE   NEG   Final  
 WBC 10/19/2018 0-4  0 - 4 /hpf Final  
 RBC 10/19/2018 0-5  0 - 5 /hpf Final  
 Epithelial cells 10/19/2018 FEW  FEW /lpf Final  
 Bacteria 10/19/2018 NEGATIVE   NEG /hpf Final  
 UA:UC IF INDICATED 10/19/2018 CULTURE NOT INDICATED BY UA RESULT  CNI   Final  
 Hyaline cast 10/19/2018 2-5  0 - 5 /lpf Final  
 ALCOHOL(ETHYL),SERUM 10/19/2018 <10  <10 MG/DL Final  
 
  
RADIOLOGY REPORTS: 
No results found for this or any previous visit. No results found. MEDICATIONS ALL MEDICATIONS Current Facility-Administered Medications Medication Dose Route Frequency  QUEtiapine (SEROquel) tablet 25 mg  25 mg Oral QHS  ziprasidone (GEODON) 20 mg in sterile water (preservative free) 1 mL injection  20 mg IntraMUSCular BID PRN  
 OLANZapine (ZyPREXA) tablet 5 mg  5 mg Oral Q6H PRN  
 benztropine (COGENTIN) tablet 2 mg  2 mg Oral BID PRN  
 benztropine (COGENTIN) injection 2 mg  2 mg IntraMUSCular BID PRN  
 LORazepam (ATIVAN) injection 2 mg  2 mg IntraMUSCular Q4H PRN  
 LORazepam (ATIVAN) tablet 1 mg  1 mg Oral Q4H PRN  
 zolpidem (AMBIEN) tablet 10 mg  10 mg Oral QHS PRN  
 acetaminophen (TYLENOL) tablet 650 mg  650 mg Oral Q4H PRN  
 ibuprofen (MOTRIN) tablet 400 mg  400 mg Oral Q8H PRN  
  magnesium hydroxide (MILK OF MAGNESIA) 400 mg/5 mL oral suspension 30 mL  30 mL Oral DAILY PRN  
 nicotine (NICODERM CQ) 21 mg/24 hr patch 1 Patch  1 Patch TransDERmal DAILY PRN  
  
SCHEDULED MEDICATIONS Current Facility-Administered Medications Medication Dose Route Frequency  QUEtiapine (SEROquel) tablet 25 mg  25 mg Oral QHS  
 
  
 
 
 
ASSESSMENT & PLAN The patient, Aruna Collins, is a 26 y.o.  male who presents at this time for treatment of the following diagnoses: 
Patient Active Hospital Problem List: 
Psychosis NOS Obtain collateral hx, PTA meds information Start Seroquel 25 mg qhs I will continue to monitor blood levels (Depakote, Tegretol, lithium, clozapine---a drug with a narrow therapeutic index= NTI) and associated labs for drug therapy implemented that require intense monitoring for toxicity as deemed appropriate based on current medication side effects and pharmacodynamically determined drug 1/2 lives. A coordinated, multidisplinary treatment team (includes the nurse, unit pharmcist,  and writer) round was conducted for this initial evaluation with the patient present. The following regarding medications was addressed during rounds with patient:  
the risks and benefits of the proposed medication. The patient was given the opportunity to ask questions. Informed consent given to the use of the above medications. I will continue to adjust psychiatric and non-psychiatric medications (see above \"medication\" section and orders section for details) as deemed appropriate & based upon diagnoses and response to treatment. I have reviewed admission (and previous/old) labs and medical tests in the EHR and or transferring hospital documents. I will continue to order blood tests/labs and diagnostic tests as deemed appropriate and review results as they become available (see orders for details). I have reviewed old psychiatric and medical records available in the EHR. I Will order additional psychiatric records from other institutions to further elucidate the nature of patient's psychopathology and review once available. I will gather additional collateral information from friends, family and o/p treatment team to further elucidate the nature of patient's psychopathology and baselline level of psychiatric functioning. ESTIMATED LENGTH OF STAY:   
tbd  
 
 
STRENGTHS: 
Exercising self-direction/Resourceful, Access to housing/residential stability, Interpersonal/supportive relationships (family, friends, peers) and Motivated and ready for change SIGNED:   
Tiffanie Quintanilla MD 
10/20/2018

## 2018-10-21 NOTE — PROGRESS NOTES
Problem: Psychosis Goal: *STG: Patient will verbalize areas in need of boundary recognition and limit setting Outcome: Progressing Towards Goal 
Patient alert and oriented. Calm, cooperative, isolative. No behavioral issues at this time. Denies HI/SI. Will continue to assess patient and complete safety checks.

## 2018-10-21 NOTE — H&P
PSYCHIATRIC PROGRESS NOTE Patient Name  Harris Holland Date of Birth 1995 [facility] [id number] Medical Record Number  [id number] Age  26 y.o. PCP None Admit date:  10/19/2018 Room Number  822/15  @ [facility]  
Date of Service  10/21/2018 PSYCHOTHERAPY SESSION NOTE: 
Length of psychotherapy session: 15 minutes Main condition/diagnosis/issues treated during session today, 10/21/2018 : I employed Cognitive Behavioral therapy techniques, Reality-Oriented psychotherapy, as well as supportive psychotherapy in regards to various ongoing psychosocial stressors, including the following: pre-admission and current problems; housing issues; occupational issues; academic issues; legal issues; medical issues; and stress of hospitalization. Interpersonal relationship issues and psychodynamic conflicts explored. Attempts made to alleviate maladaptive patterns. We, also, worked on issues of denial & effects of substance dependency/use Overall, patient is progressing Treatment Plan Update (reviewed an updated 10/21/2018) : I will modify psychotherapy tx plan by implementing more stress management strategies, building upon cognitive behavioral techniques, increasing coping skills, as well as shoring up psychological defenses). An extended energy and skill set was needed to engage pt in psychotherapy due to some of the following: resistiveness, complexity, negativity, confrontational nature, hostile behaviors, and/or severe abnormalities in thought processes/psychosis resulting in the loss of expressive/receptive language communication skills. E & M PROGRESS NOTE:  
 
 
HISTORY  
   
REASON FOR HOSPITALIZATION: 
CC:  Pt admitted under a temporary alf order (TDO) with severe psychosis  proving to be an imminent danger to self. HISTORY OF PRESENT ILLNESS:   
The patient, Harris Holland, is a 26 y.o.   WHITE [place]  male with a past psychiatric history significant for ? Bipolar D/O, who presents at this time with complaints of (and/or evidence of) the following emotional symptoms: delusions, psychotic behavior, anxiety and psychosis. Additional symptomatology include anxiety and fear of dying. The above symptoms have been present for 3 days. These symptoms are of high severity. These symptoms are constant in nature. The patient's condition has been precipitated by multiple psychosocial stressors. Patient's condition made worse by treatment noncompliance. UDS:negative; BAL=0. Pt was reportedly driving from [facility] to [place], [place] to his family. He was on the phone with his grandfather who found him to be very delusional and facilitated his coming to [facility]. Pt presented very disorganized, anxious, bizarre. He has not slept for 3 days. He stated that on the way he wanted to stop by in [place] to visit his father's grave who  8 yrs ago in the month of October. His mother had called to report, she found a bottle of Celexa pills in his car. Pt denies any SI/HI. Pt reportedly is also stressed out from his new job. 10/21- Much improved from yesterday, better thought organization still some blocking and delayed response. SIDE EFFECTS: (reviewed/updated 10/21/2018) None reported or admitted to. No noted toxicity with use of Depakote/Tegretol/lithium/Clozaril/TCAs ALLERGIES:(reviewed/updated 10/21/2018) No Known Allergies MEDICATIONS PRIOR TO ADMISSION:(reviewed/updated 10/21/2018) No medications prior to admission. PAST MEDICAL HISTORY: Past medical history from the initial psychiatric evaluation has been reviewed (reviewed/updated 10/21/2018) with no additional updates (I asked patient and no additional past medical history provided). Past Medical History:  
Diagnosis Date  Bipolar 1 disorder ([address] No past surgical history on file. SOCIAL HISTORY: Social history from the initial psychiatric evaluation has been reviewed (reviewed/updated 10/21/2018) with no additional updates (I asked patient and no additional social history provided). Social History Socioeconomic History  Marital status: SINGLE Spouse name: Not on file  Number of children: Not on file  Years of education: Not on file  Highest education level: Not on file Social Needs  Financial resource strain: Not on file  Food insecurity - worry: Not on file  Food insecurity - inability: Not on file  Transportation needs - medical: Not on file  Transportation needs - non-medical: Not on file Occupational History  Not on file Tobacco Use  Smoking status: Current Every Day Smoker Substance and Sexual Activity  Alcohol use: Yes  Drug use: Yes Comment: did not specify  Sexual activity: Not on file Other Topics Concern  Not on file Social History Narrative  Not on file FAMILY HISTORY: Family history from the initial psychiatric evaluation has been reviewed (reviewed/updated 10/21/2018) with no additional updates (I asked patient and no additional family history provided). No family history on file. REVIEW OF SYSTEMS: (reviewed/updated 10/21/2018) Appetite:improved Sleep: improved All other Review of Systems: Psychological ROS: positive for - concentration difficulties 
negative for - hallucinations or suicidal ideation [facility] MENTAL STATUS EXAM (MSE): MSE FINDINGS ARE WITHIN NORMAL LIMITS (WNL) UNLESS OTHERWISE STATED BELOW. ( ALL OF THE BELOW CATEGORIES OF THE MSE HAVE BEEN REVIEWED (reviewed 10/21/2018) AND UPDATED AS DEEMED APPROPRIATE ) General Presentation age appropriate and casually dressed, cooperative and guarded Orientation oriented to time, place and person Vital Signs  See below (reviewed 10/21/2018);  Vital Signs (BP, Pulse, & Temp) are within normal limits if not listed below. Gait and Station Stable/steady, no ataxia Musculoskeletal System No extrapyramidal symptoms (EPS); no abnormal muscular movements or Tardive Dyskinesia (TD); muscle strength and tone are within normal limits Language No aphasia or dysarthria Speech:  hypoverbal, monotone and soft Thought Processes logical; slow rate of thoughts; fair abstract reasoning/computation Thought Associations blocked Thought Content free of delusions and free of hallucinations Suicidal Ideations none Homicidal Ideations none Mood:  anxious Affect:  constricted Memory recent  fair Memory remote:  good Concentration/Attention:  fair Fund of Knowledge average Insight:  fair Reliability fair Judgment:  good VITALS:    
Patient Vitals for the past 24 hrs: 
 Temp Pulse Resp BP SpO2  
10/21/18 0845 98.6 °F (37 °C) (!) 110 16 150/90 100 % 10/20/18 2129 98.2 °F (36.8 °C) 82 16 131/85   
10/20/18 1643 98.1 °F (36.7 °C) (!) 102  (!) 147/96 99 % Wt Readings from Last 3 Encounters:  
10/19/18 68 kg (150 lb) Temp Readings from Last 3 Encounters:  
10/21/18 98.6 °F (37 °C)  
10/19/18 98.5 °F (36.9 °C) BP Readings from Last 3 Encounters:  
10/21/18 150/90  
10/19/18 (!) 135/93 Pulse Readings from Last 3 Encounters:  
10/21/18 (!) 110  
10/19/18 82 DATA LABORATORY DATA:(reviewed/updated 10/21/2018) Recent Results (from the past 24 hour(s)) POTASSIUM Collection Time: 10/21/18 11:04 AM  
Result Value Ref Range Potassium 3.7 3.5 - 5.1 mmol/L No results found for: VALF2, VALAC, VALP, VALPR, DS6, CRBAM, CRBAMP, CARB2, XCRBAM 
No results found for: LITHM  
RADIOLOGY REPORTS:(reviewed/updated 10/21/2018) No results found. MEDICATIONS ALL MEDICATIONS:  
Current Facility-Administered Medications Medication Dose Route Frequency  QUEtiapine (SEROquel) tablet 25 mg  25 mg Oral QHS  ziprasidone (GEODON) 20 mg in sterile water (preservative free) 1 mL injection  20 mg IntraMUSCular BID PRN  
 OLANZapine (ZyPREXA) tablet 5 mg  5 mg Oral Q6H PRN  
 benztropine (COGENTIN) tablet 2 mg  2 mg Oral BID PRN  
 benztropine (COGENTIN) injection 2 mg  2 mg IntraMUSCular BID PRN  
 LORazepam (ATIVAN) injection 2 mg  2 mg IntraMUSCular Q4H PRN  
 LORazepam (ATIVAN) tablet 1 mg  1 mg Oral Q4H PRN  
 zolpidem (AMBIEN) tablet 10 mg  10 mg Oral QHS PRN  
 acetaminophen (TYLENOL) tablet 650 mg  650 mg Oral Q4H PRN  
 ibuprofen (MOTRIN) tablet 400 mg  400 mg Oral Q8H PRN  
 magnesium hydroxide (MILK OF MAGNESIA) 400 mg/5 mL oral suspension 30 mL  30 mL Oral DAILY PRN  
 nicotine (NICODERM CQ) 21 mg/24 hr patch 1 Patch  1 Patch TransDERmal DAILY PRN  
  
SCHEDULED MEDICATIONS:  
Current Facility-Administered Medications Medication Dose Route Frequency  QUEtiapine (SEROquel) tablet 25 mg  25 mg Oral QHS  
  
 
ASSESSMENT & PLAN  
 
DIAGNOSES REQUIRING ACTIVE TREATMENT AND MONITORING: (reviewed/updated 10/21/2018) Patient Active Hospital Problem List: 
 Psychosis [facility]) (10/21/2018) Assessment: Much improved from yesterday after some sleep and Seroquel Plan:Ctn Seroquel 25 mg qhs, Titrate. Collateral hx and PTA meds. I will continue to monitor blood levels (Depakote, Tegretol, lithium, clozapine---a drug with a narrow therapeutic index= NTI) and associated labs for drug therapy implemented that require intense monitoring for toxicity as deemed appropriate based on current medication side effects and pharmacodynamically determined drug 1/2 lives. In summary, Jeanette Hernandez, is a 26 y.o.  male who presents with a severe exacerbation of the principal diagnosis of <principal problem not specified> Patient's condition is worsening/not improving/not stable improving.   Patient requires continued inpatient hospitalization for further stabilization, safety monitoring and medication management. I will continue to coordinate the provision of individual, milieu, occupational, group, and substance abuse therapies to address target symptoms/diagnoses as deemed appropriate for the individual patient. A coordinated, multidisplinary treatment team round was conducted with the patient (this team consists of the nurse, psychiatric unit pharmcist,  and writer). Complete current electronic health record for patient has been reviewed today including consultant notes, ancillary staff notes, nurses and psychiatric tech notes. Suicide risk assessment completed and patient deemed to be of low risk for suicide at this time. The following regarding medications was addressed during rounds with patient:  
the risks and benefits of the proposed medication. The patient was given the opportunity to ask questions. Informed consent given to the use of the above medications. Will continue to adjust psychiatric and non-psychiatric medications (see above \"medication\" section and orders section for details) as deemed appropriate & based upon diagnoses and response to treatment. I will continue to order blood tests/labs and diagnostic tests as deemed appropriate and review results as they become available (see orders for details and above listed lab/test results). I will order psychiatric records from previous [facility] hospitals to further elucidate the nature of patient's psychopathology and review once available. I will gather additional collateral information from friends, family and o/p treatment team to further elucidate the nature of patient's psychopathology and baselline level of psychiatric functioning.  
  
 
 
I certify that this patient's inpatient psychiatric hospital services furnished since the previous certification were, and continue to be, required for treatment that could reasonably be expected to improve the patient's condition, or for diagnostic study, and that the patient continues to need, on a daily basis, active treatment furnished directly by or requiring the supervision of inpatient psychiatric facility personnel. In addition, the hospital records show that services furnished were intensive treatment services, admission or related services, or equivalent services. EXPECTED DISCHARGE DATE/DAY: TBD  
 
DISPOSITION: Home Signed By:  
Nataliya Baez MD 
10/21/2018
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
## 2018-10-21 NOTE — PROGRESS NOTES
Problem: Psychosis Goal: *STG/LTG: Demonstrates improved thought patterns as evidenced by logical and coherent speech Outcome: Progressing Towards Goal 
Patient is alert and oriented x 4, denies SI/HI, denies auditory or visual hallucination. Patient was calm and cooperative. Patient was compliant with medications. Patient was visible in the milieu watching television. Staff will continue to monitor patient.

## 2018-10-22 PROCEDURE — 65220000003 HC RM SEMIPRIVATE PSYCH

## 2018-10-22 PROCEDURE — 74011250637 HC RX REV CODE- 250/637: Performed by: PSYCHIATRY & NEUROLOGY

## 2018-10-22 RX ORDER — QUETIAPINE FUMARATE 25 MG/1
50 TABLET, FILM COATED ORAL
Status: DISCONTINUED | OUTPATIENT
Start: 2018-10-22 | End: 2018-10-22

## 2018-10-22 RX ORDER — RISPERIDONE 0.25 MG/1
0.5 TABLET, FILM COATED ORAL EVERY 12 HOURS
Status: DISCONTINUED | OUTPATIENT
Start: 2018-10-22 | End: 2018-10-23

## 2018-10-22 RX ADMIN — RISPERIDONE 0.5 MG: 0.25 TABLET ORAL at 21:36

## 2018-10-22 RX ADMIN — ZOLPIDEM TARTRATE 10 MG: 10 TABLET ORAL at 21:36

## 2018-10-22 NOTE — PROGRESS NOTES
Pnt ambulatory verbal alert and oriented RR even and unlabored Pnt in dayroom with family Denies any complaints @ this time No agitation or aggressive behaviors noted Will continue to monitor for safety

## 2018-10-22 NOTE — PROGRESS NOTES
[facility] Admission Pharmacy Medication Reconciliation Recommendations/Findings:  
1) Patient denies taking any prescription or over-the-counter medications. Total Time Spent: 5 minutes Information obtained from: Patient Patient allergies: Allergies as of 10/19/2018  (No Known Allergies) Prior to admission medications:  
None Thank you, Guillermo David, PHARMD, BCPS

## 2018-10-22 NOTE — BH NOTES
GROUP THERAPY PROGRESS NOTE The patient Elayne jones 26 y.o. male is participating in [employer]. Group time: 1 hour Personal goal for participation: To concentrate on selected task Goal orientation: social 
 
Group therapy participation: active Therapeutic interventions reviewed and discussed: Crafts, games, music Impression of participation: The patient was attentive. Denae Rodríguez 10/22/2018  5:57 PM

## 2018-10-22 NOTE — BH NOTES
GROUP THERAPY PROGRESS NOTE The patient Martina jones 26 y.o. male is participating in [address] [place] [address]. Group time: 45 minutes Personal goal for participation: To identify people and things most grateful for 
 
Goal orientation:  personal 
 
Group therapy participation: active Therapeutic interventions reviewed and discussed: worksheet Impression of participation:  The patient was attentive. Edgardo Anne 10/22/2018  5:23 PM

## 2018-10-22 NOTE — H&P
PSYCHIATRIC PROGRESS NOTE Patient Name  Haris Colmenares Date of Birth 1995 [facility] [id number] Medical Record Number  [id number] Age  26 y.o. PCP None Admit date:  10/19/2018 Room Number  644/26  @ [facility]  
Date of Service  10/22/2018 E & M PROGRESS NOTE:  
 
 
HISTORY  
   
REASON FOR HOSPITALIZATION: 
CC:  Pt admitted under a temporary intermediate order (TDO) with severe psychosis  proving to be an imminent danger to self. HISTORY OF PRESENT ILLNESS:   
The patient, Haris Colmenares, is a 26 y.o. WHITE OR  male with a past psychiatric history significant for ? Bipolar D/O, who presents at this time with complaints of (and/or evidence of) the following emotional symptoms: delusions, psychotic behavior, anxiety and psychosis. Additional symptomatology include anxiety and fear of dying. The above symptoms have been present for 3 days. These symptoms are of high severity. These symptoms are constant in nature. The patient's condition has been precipitated by multiple psychosocial stressors. Patient's condition made worse by treatment noncompliance. UDS:negative; BAL=0. Pt was reportedly driving from [facility] to [place], [place] to his family. He was on the phone with his grandfather who found him to be very delusional and facilitated his coming to [facility]. Pt presented very disorganized, anxious, bizarre. He has not slept for 3 days. He stated that on the way he wanted to stop by in [place] to visit his father's grave who  8 yrs ago in the month of October. His mother had called to report, she found a bottle of Celexa pills in his car. Pt denies any SI/HI. Pt reportedly is also stressed out from his new job. 10/21- Much improved from yesterday, better thought organization still some blocking and delayed response.  
10/22- patient seen in treatment team rounds; he provides a coherent but disorganized narrative about the events prior to admission. He acknowledges having left his apt in [place] without informing any family members or his roommate of his plans. SIDE EFFECTS: (reviewed/updated 10/22/2018) None reported or admitted to. No noted toxicity with use of Depakote/Tegretol/lithium/Clozaril/TCAs ALLERGIES:(reviewed/updated 10/22/2018) No Known Allergies MEDICATIONS PRIOR TO ADMISSION:(reviewed/updated 10/22/2018) No medications prior to admission. PAST MEDICAL HISTORY: Past medical history from the initial psychiatric evaluation has been reviewed (reviewed/updated 10/22/2018) with no additional updates (I asked patient and no additional past medical history provided). Past Medical History:  
Diagnosis Date  Bipolar 1 disorder ([facility] [address] No past surgical history on file. SOCIAL HISTORY: Social history from the initial psychiatric evaluation has been reviewed (reviewed/updated 10/22/2018) with no additional updates (I asked patient and no additional social history provided). Social History Socioeconomic History  Marital status: SINGLE Spouse name: Not on file  Number of children: Not on file  Years of education: Not on file  Highest education level: Not on file Social Needs  Financial resource strain: Not on file  Food insecurity - worry: Not on file  Food insecurity - inability: Not on file  Transportation needs - medical: Not on file  Transportation needs - non-medical: Not on file Occupational History  Not on file Tobacco Use  Smoking status: Current Every Day Smoker Substance and Sexual Activity  Alcohol use: Yes  Drug use: Yes Comment: did not specify  Sexual activity: Not on file Other Topics Concern  Not on file Social History Narrative  Not on file FAMILY HISTORY: Family history from the initial psychiatric evaluation has been reviewed (reviewed/updated 10/22/2018) with no additional updates (I asked patient and no additional family history provided). No family history on file. REVIEW OF SYSTEMS: (reviewed/updated 10/22/2018) Appetite:improved Sleep: improved All other Review of Systems: Psychological ROS: positive for - concentration difficulties 
negative for - hallucinations or suicidal ideation [facility] MENTAL STATUS EXAM (MSE): MSE FINDINGS ARE WITHIN NORMAL LIMITS (WNL) UNLESS OTHERWISE STATED BELOW. ( ALL OF THE BELOW CATEGORIES OF THE MSE HAVE BEEN REVIEWED (reviewed 10/22/2018) AND UPDATED AS DEEMED APPROPRIATE ) General Presentation age appropriate and casually dressed, cooperative and guarded Orientation oriented to time, place and person Vital Signs  See below (reviewed 10/22/2018); Vital Signs (BP, Pulse, & Temp) are within normal limits if not listed below. Gait and Station Stable/steady, no ataxia Musculoskeletal System No extrapyramidal symptoms (EPS); no abnormal muscular movements or Tardive Dyskinesia (TD); muscle strength and tone are within normal limits Language No aphasia or dysarthria Speech:  hypoverbal, monotone and soft Thought Processes logical; slow rate of thoughts; fair abstract reasoning/computation Thought Associations blocked Thought Content preoccupations and internally preoccupied Suicidal Ideations none Homicidal Ideations none Mood:  anxious Affect:  constricted Memory recent  fair Memory remote:  good Concentration/Attention:  fair Fund of Knowledge average Insight:  fair Reliability fair Judgment:  good VITALS:    
Patient Vitals for the past 24 hrs: 
 Temp Pulse Resp BP  
10/21/18 2000 97.7 °F (36.5 °C) 94 18 144/82 Wt Readings from Last 3 Encounters:  
10/19/18 68 kg (150 lb) Temp Readings from Last 3 Encounters:  
10/21/18 97.7 °F (36.5 °C)  
10/19/18 98.5 °F (36.9 °C) BP Readings from Last 3 Encounters:  
10/21/18 144/82  
10/19/18 (!) 135/93 Pulse Readings from Last 3 Encounters:  
10/21/18 94  
10/19/18 82 DATA LABORATORY DATA:(reviewed/updated 10/22/2018) No results found for this or any previous visit (from the past 24 hour(s)). No results found for: VALF2, VALAC, VALP, VALPR, DS6, CRBAM, CRBAMP, CARB2, XCRBAM 
No results found for: LITHM  
RADIOLOGY REPORTS:(reviewed/updated 10/22/2018) No results found. MEDICATIONS ALL MEDICATIONS:  
Current Facility-Administered Medications Medication Dose Route Frequency  QUEtiapine (SEROquel) tablet 50 mg  50 mg Oral QHS  ziprasidone (GEODON) 20 mg in sterile water (preservative free) 1 mL injection  20 mg IntraMUSCular BID PRN  
 OLANZapine (ZyPREXA) tablet 5 mg  5 mg Oral Q6H PRN  
 benztropine (COGENTIN) tablet 2 mg  2 mg Oral BID PRN  
 benztropine (COGENTIN) injection 2 mg  2 mg IntraMUSCular BID PRN  
 LORazepam (ATIVAN) injection 2 mg  2 mg IntraMUSCular Q4H PRN  
 LORazepam (ATIVAN) tablet 1 mg  1 mg Oral Q4H PRN  
 zolpidem (AMBIEN) tablet 10 mg  10 mg Oral QHS PRN  
 acetaminophen (TYLENOL) tablet 650 mg  650 mg Oral Q4H PRN  
 ibuprofen (MOTRIN) tablet 400 mg  400 mg Oral Q8H PRN  
 magnesium hydroxide (MILK OF MAGNESIA) 400 mg/5 mL oral suspension 30 mL  30 mL Oral DAILY PRN  
 nicotine (NICODERM CQ) 21 mg/24 hr patch 1 Patch  1 Patch TransDERmal DAILY PRN  
  
SCHEDULED MEDICATIONS:  
Current Facility-Administered Medications Medication Dose Route Frequency  QUEtiapine (SEROquel) tablet 50 mg  50 mg Oral QHS  
  
 
ASSESSMENT & PLAN  
 
DIAGNOSES REQUIRING ACTIVE TREATMENT AND MONITORING: (reviewed/updated 10/22/2018) Patient Active Hospital Problem List: 
 Psychosis [facility]) (10/21/2018) Assessment: Still with disorganization. Concern for thought disorder vs MDD with psychosis. Start a strong agent with depot availability Plan: 
- DISCONTINUE Seroquel - START Risperdal 0.5 mg Q12H for psychosis - Consider antidepressant In summary, Martina Jackson, is a 26 y.o.  male who presents with a severe exacerbation of the principal diagnosis of Psychosis ([address] Patient's condition is improving. Patient requires continued inpatient hospitalization for further stabilization, safety monitoring and medication management. I will continue to coordinate the provision of individual, milieu, occupational, group, and substance abuse therapies to address target symptoms/diagnoses as deemed appropriate for the individual patient. A coordinated, multidisplinary treatment team round was conducted with the patient (this team consists of the nurse, psychiatric unit pharmcist,  and writer). Complete current electronic health record for patient has been reviewed today including consultant notes, ancillary staff notes, nurses and psychiatric tech notes. Suicide risk assessment completed and patient deemed to be of low risk for suicide at this time. The following regarding medications was addressed during rounds with patient:  
the risks and benefits of the proposed medication. The patient was given the opportunity to ask questions. Informed consent given to the use of the above medications. Will continue to adjust psychiatric and non-psychiatric medications (see above \"medication\" section and orders section for details) as deemed appropriate & based upon diagnoses and response to treatment. I will continue to order blood tests/labs and diagnostic tests as deemed appropriate and review results as they become available (see orders for details and above listed lab/test results). I will order psychiatric records from previous [facility] hospitals to further elucidate the nature of patient's psychopathology and review once available.  
 
I will gather additional collateral information from friends, family and o/p treatment team to further elucidate the nature of patient's psychopathology and baselline level of psychiatric functioning. I certify that this patient's inpatient psychiatric hospital services furnished since the previous certification were, and continue to be, required for treatment that could reasonably be expected to improve the patient's condition, or for diagnostic study, and that the patient continues to need, on a daily basis, active treatment furnished directly by or requiring the supervision of inpatient psychiatric facility personnel. In addition, the hospital records show that services furnished were intensive treatment services, admission or related services, or equivalent services. EXPECTED DISCHARGE DATE/DAY: TBD  
 
DISPOSITION: Home Signed By:  
Michael Nieto MD 
10/22/2018
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
## 2018-10-23 PROBLEM — F54 PSYCH & BEHAVRL FACTORS ASSOC W DISORD OR DIS CLASSD ELSWHR: Status: ACTIVE | Noted: 2018-10-23

## 2018-10-23 LAB
CHOLEST SERPL-MCNC: 106 MG/DL
EST. AVERAGE GLUCOSE BLD GHB EST-MCNC: NORMAL MG/DL
HBA1C MFR BLD: 4.7 % (ref 4.2–6.3)
HDLC SERPL-MCNC: 45 MG/DL
HDLC SERPL: 2.4 {RATIO} (ref 0–5)
LDLC SERPL CALC-MCNC: 46.4 MG/DL (ref 0–100)
LIPID PROFILE,FLP: NORMAL
TRIGL SERPL-MCNC: 73 MG/DL (ref ?–150)
VLDLC SERPL CALC-MCNC: 14.6 MG/DL

## 2018-10-23 PROCEDURE — 83036 HEMOGLOBIN GLYCOSYLATED A1C: CPT | Performed by: PSYCHIATRY & NEUROLOGY

## 2018-10-23 PROCEDURE — 65220000003 HC RM SEMIPRIVATE PSYCH

## 2018-10-23 PROCEDURE — 74011250637 HC RX REV CODE- 250/637: Performed by: PSYCHIATRY & NEUROLOGY

## 2018-10-23 PROCEDURE — 80061 LIPID PANEL: CPT | Performed by: PSYCHIATRY & NEUROLOGY

## 2018-10-23 PROCEDURE — 36415 COLL VENOUS BLD VENIPUNCTURE: CPT | Performed by: PSYCHIATRY & NEUROLOGY

## 2018-10-23 RX ORDER — RISPERIDONE 1 MG/1
1 TABLET, FILM COATED ORAL
Status: DISCONTINUED | OUTPATIENT
Start: 2018-10-24 | End: 2018-10-25 | Stop reason: HOSPADM

## 2018-10-23 RX ORDER — RISPERIDONE 0.25 MG/1
0.25 TABLET, FILM COATED ORAL
Status: COMPLETED | OUTPATIENT
Start: 2018-10-23 | End: 2018-10-23

## 2018-10-23 RX ORDER — RISPERIDONE 0.25 MG/1
0.5 TABLET, FILM COATED ORAL EVERY 12 HOURS
Status: COMPLETED | OUTPATIENT
Start: 2018-10-23 | End: 2018-10-23

## 2018-10-23 RX ADMIN — RISPERIDONE 0.25 MG: 0.25 TABLET ORAL at 22:45

## 2018-10-23 RX ADMIN — RISPERIDONE 0.25 MG: 0.25 TABLET ORAL at 21:33

## 2018-10-23 RX ADMIN — RISPERIDONE 0.5 MG: 0.25 TABLET ORAL at 09:20

## 2018-10-23 RX ADMIN — ZOLPIDEM TARTRATE 10 MG: 10 TABLET ORAL at 21:33

## 2018-10-23 NOTE — BH NOTES
Pt has been visiting with family this evening. Visitation appeared to be therapeutic to the patient.

## 2018-10-23 NOTE — BH NOTES
Pt has been calm and cooperative throughout the day today. Pt comes out of his room for meals and to watch tv in the day room. Pt is confused at times, says he knows his thoughts aren't right but is unable to describe how. Will continue to monitor per protocol.

## 2018-10-23 NOTE — BH NOTES
Remained in bed resting quietly. Patient slept 6.5 hours this shift. Staff will continue to monitor q15 minutes for safety.

## 2018-10-23 NOTE — H&P
PSYCHIATRIC PROGRESS NOTE Patient Name  Bella Porter Date of Birth 1995 [facility] [id number] Medical Record Number  [id number] Age  26 y.o. PCP None Admit date:  10/19/2018 Room Number  981/42  @ [facility]  
Date of Service  10/23/2018 E & M PROGRESS NOTE:  
 
 
HISTORY  
   
REASON FOR HOSPITALIZATION: 
CC:  Pt admitted under a temporary nursing home order (TDO) with severe psychosis  proving to be an imminent danger to self. HISTORY OF PRESENT ILLNESS:   
The patient, Bella Porter, is a 26 y.o. WHITE [place]  male with a past psychiatric history significant for ? Bipolar D/O, who presents at this time with complaints of (and/or evidence of) the following emotional symptoms: delusions, psychotic behavior, anxiety and psychosis. Additional symptomatology include anxiety and fear of dying. The above symptoms have been present for 3 days. These symptoms are of high severity. These symptoms are constant in nature. The patient's condition has been precipitated by multiple psychosocial stressors. Patient's condition made worse by treatment noncompliance. UDS:negative; BAL=0. [facility] [place] to his family. He was on the phone with his grandfather who found him to be very delusional and facilitated his coming to [facility]. Pt presented very disorganized, anxious, bizarre. He has not slept for 3 days. He stated that on the way he wanted to stop by in [place] to visit his father's grave who  8 yrs ago in the month of October. His mother had called to report, she found a bottle of Celexa pills in his car. Pt denies any SI/HI. Pt reportedly is also stressed out from his new job. 10/21- Much improved from yesterday, better thought organization still some blocking and delayed response.  
10/22- patient seen in treatment team rounds; he provides a coherent but disorganized narrative about the events prior to admission. He acknowledges having left his apt in [place] without informing any family members or his roommate of his plans. 10/23- patient more organized, tolerated new medication. Improving insight re: events leading up to presentation SIDE EFFECTS: (reviewed/updated 10/23/2018) None reported or admitted to. No noted toxicity with use of Depakote/Tegretol/lithium/Clozaril/TCAs ALLERGIES:(reviewed/updated 10/23/2018) No Known Allergies MEDICATIONS PRIOR TO ADMISSION:(reviewed/updated 10/23/2018) No medications prior to admission. PAST MEDICAL HISTORY: Past medical history from the initial psychiatric evaluation has been reviewed (reviewed/updated 10/23/2018) with no additional updates (I asked patient and no additional past medical history provided). Past Medical History:  
Diagnosis Date  Bipolar 1 disorder ([address] No past surgical history on file. SOCIAL HISTORY: Social history from the initial psychiatric evaluation has been reviewed (reviewed/updated 10/23/2018) with no additional updates (I asked patient and no additional social history provided). Social History Socioeconomic History  Marital status: SINGLE Spouse name: Not on file  Number of children: Not on file  Years of education: Not on file  Highest education level: Not on file Social Needs  Financial resource strain: Not on file  Food insecurity - worry: Not on file  Food insecurity - inability: Not on file  Transportation needs - medical: Not on file  Transportation needs - non-medical: Not on file Occupational History  Not on file Tobacco Use  Smoking status: Current Every Day Smoker Substance and Sexual Activity  Alcohol use: Yes  Drug use: Yes Comment: did not specify  Sexual activity: Not on file Other Topics Concern  Not on file Social History Narrative  Not on file FAMILY HISTORY: Family history from the initial psychiatric evaluation has been reviewed (reviewed/updated 10/23/2018) with no additional updates (I asked patient and no additional family history provided). No family history on file. REVIEW OF SYSTEMS: (reviewed/updated 10/23/2018) Appetite:improved Sleep: improved All other Review of Systems: Psychological ROS: positive for - concentration difficulties 
negative for - hallucinations or suicidal ideation [facility] MENTAL STATUS EXAM (MSE): MSE FINDINGS ARE WITHIN NORMAL LIMITS (WNL) UNLESS OTHERWISE STATED BELOW. ( ALL OF THE BELOW CATEGORIES OF THE MSE HAVE BEEN REVIEWED (reviewed 10/23/2018) AND UPDATED AS DEEMED APPROPRIATE ) General Presentation age appropriate and casually dressed, cooperative and guarded Orientation oriented to time, place and person Vital Signs  See below (reviewed 10/23/2018); Vital Signs (BP, Pulse, & Temp) are within normal limits if not listed below. Gait and Station Stable/steady, no ataxia Musculoskeletal System No extrapyramidal symptoms (EPS); no abnormal muscular movements or Tardive Dyskinesia (TD); muscle strength and tone are within normal limits Language No aphasia or dysarthria Speech:  hypoverbal, monotone and soft Thought Processes logical; slow rate of thoughts; fair abstract reasoning/computation Thought Associations blocked Thought Content preoccupations and internally preoccupied Suicidal Ideations none Homicidal Ideations none Mood:  anxious Affect:  constricted Memory recent  fair Memory remote:  good Concentration/Attention:  fair Fund of Knowledge average Insight:  fair Reliability fair Judgment:  good VITALS:    
Patient Vitals for the past 24 hrs: 
 Temp Pulse Resp BP SpO2  
10/23/18 0824 97.9 °F (36.6 °C) 91 18 125/88 100 % 10/22/18 2043 98.3 °F (36.8 °C) 80 16 (!) 144/92 99 % Wt Readings from Last 3 Encounters: 10/19/18 68 kg (150 lb) Temp Readings from Last 3 Encounters:  
10/23/18 97.9 °F (36.6 °C)  
10/19/18 98.5 °F (36.9 °C) BP Readings from Last 3 Encounters:  
10/23/18 125/88  
10/19/18 (!) 135/93 Pulse Readings from Last 3 Encounters:  
10/23/18 91  
10/19/18 82 DATA LABORATORY DATA:(reviewed/updated 10/23/2018) Recent Results (from the past 24 hour(s)) HEMOGLOBIN A1C WITH EAG Collection Time: 10/23/18  4:42 AM  
Result Value Ref Range Hemoglobin A1c 4.7 4.2 - 6.3 % Est. average glucose Cannot be calculated mg/dL LIPID PANEL Collection Time: 10/23/18  4:42 AM  
Result Value Ref Range LIPID PROFILE Cholesterol, total 106 <200 MG/DL Triglyceride 73 <150 MG/DL  
 HDL Cholesterol 45 MG/DL  
 LDL, calculated 46.4 0 - 100 MG/DL VLDL, calculated 14.6 MG/DL  
 CHOL/HDL Ratio 2.4 0 - 5.0 No results found for: VALF2, VALAC, VALP, VALPR, DS6, CRBAM, CRBAMP, CARB2, XCRBAM 
No results found for: LITHM  
RADIOLOGY REPORTS:(reviewed/updated 10/23/2018) No results found. MEDICATIONS ALL MEDICATIONS:  
Current Facility-Administered Medications Medication Dose Route Frequency  risperiDONE (RisperDAL) tablet 0.5 mg  0.5 mg Oral Q12H  
 [START ON 10/24/2018] risperiDONE (RisperDAL) tablet 1 mg  1 mg Oral QHS  ziprasidone (GEODON) 20 mg in sterile water (preservative free) 1 mL injection  20 mg IntraMUSCular BID PRN  
 OLANZapine (ZyPREXA) tablet 5 mg  5 mg Oral Q6H PRN  
 benztropine (COGENTIN) tablet 2 mg  2 mg Oral BID PRN  
 benztropine (COGENTIN) injection 2 mg  2 mg IntraMUSCular BID PRN  
 LORazepam (ATIVAN) injection 2 mg  2 mg IntraMUSCular Q4H PRN  
 LORazepam (ATIVAN) tablet 1 mg  1 mg Oral Q4H PRN  
 zolpidem (AMBIEN) tablet 10 mg  10 mg Oral QHS PRN  
 acetaminophen (TYLENOL) tablet 650 mg  650 mg Oral Q4H PRN  
 ibuprofen (MOTRIN) tablet 400 mg  400 mg Oral Q8H PRN  
  magnesium hydroxide (MILK OF MAGNESIA) 400 mg/5 mL oral suspension 30 mL  30 mL Oral DAILY PRN  
 nicotine (NICODERM CQ) 21 mg/24 hr patch 1 Patch  1 Patch TransDERmal DAILY PRN  
  
SCHEDULED MEDICATIONS:  
Current Facility-Administered Medications Medication Dose Route Frequency  risperiDONE (RisperDAL) tablet 0.5 mg  0.5 mg Oral Q12H  
 [START ON 10/24/2018] risperiDONE (RisperDAL) tablet 1 mg  1 mg Oral QHS  
  
 
ASSESSMENT & PLAN  
 
DIAGNOSES REQUIRING ACTIVE TREATMENT AND MONITORING: (reviewed/updated 10/23/2018) Patient Active Hospital Problem List: 
 Psychosis [facility]) (10/21/2018) Assessment: Still with disorganization. Concern for thought disorder vs MDD with psychosis. Start a strong agent with depot availability Plan: 
- CHANGE Risperdal to 1 mg QHS for psychosis - Consider antidepressant In summary, Maru Barbosa, is a 26 y.o.  male who presents with a severe exacerbation of the principal diagnosis of Psychosis ([address] Patient's condition is improving. Patient requires continued inpatient hospitalization for further stabilization, safety monitoring and medication management. I will continue to coordinate the provision of individual, milieu, occupational, group, and substance abuse therapies to address target symptoms/diagnoses as deemed appropriate for the individual patient. A coordinated, multidisplinary treatment team round was conducted with the patient (this team consists of the nurse, psychiatric unit pharmcist,  and writer). Complete current electronic health record for patient has been reviewed today including consultant notes, ancillary staff notes, nurses and psychiatric tech notes. Suicide risk assessment completed and patient deemed to be of low risk for suicide at this time. The following regarding medications was addressed during rounds with patient:  
the risks and benefits of the proposed medication.  The patient was given the opportunity to ask questions. Informed consent given to the use of the above medications. Will continue to adjust psychiatric and non-psychiatric medications (see above \"medication\" section and orders section for details) as deemed appropriate & based upon diagnoses and response to treatment. I will continue to order blood tests/labs and diagnostic tests as deemed appropriate and review results as they become available (see orders for details and above listed lab/test results). I will order psychiatric records from previous [facility] hospitals to further elucidate the nature of patient's psychopathology and review once available. I will gather additional collateral information from friends, family and o/p treatment team to further elucidate the nature of patient's psychopathology and baselline level of psychiatric functioning. I certify that this patient's inpatient psychiatric hospital services furnished since the previous certification were, and continue to be, required for treatment that could reasonably be expected to improve the patient's condition, or for diagnostic study, and that the patient continues to need, on a daily basis, active treatment furnished directly by or requiring the supervision of inpatient psychiatric facility personnel. In addition, the hospital records show that services furnished were intensive treatment services, admission or related services, or equivalent services. EXPECTED DISCHARGE DATE/DAY: 10/26/18 DISPOSITION: Home Signed By:  
Toi Dent MD 
10/23/2018

## 2018-10-23 NOTE — BH NOTES
GROUP THERAPY PROGRESS NOTE The patient Martina jones 26 y.o. male is participating in [employer]. Group time: 45 minutes Personal goal for participation: To participate in happiness game Goal orientation:  personal 
 
Group therapy participation: active Therapeutic interventions reviewed and discussed: life scenarios exploring the pursuit of happiness Impression of participation:  The patient was attentive. Edgardo Anne 10/23/2018  5:34 PM

## 2018-10-23 NOTE — PROGRESS NOTES
Laboratory monitoring for mood stabilizer and antipsychotics: 
 
Recommended baseline monitoring has been completed based on this patient's current medication regimen. The patient is currently taking the following medication(s):  
Current Facility-Administered Medications Medication Dose Route Frequency  risperiDONE (RisperDAL) tablet 1 mg  1 mg Oral QHS Height, Weight, BMI Estimation Estimated body mass index is 20.34 kg/m² as calculated from the following: 
  Height as of an earlier encounter on 10/19/18: 182.9 cm (72\"). Weight as of an earlier encounter on 10/19/18: 68 kg (150 lb). Renal Function, Hepatic Function and Chemistry CrCl cannot be calculated (Unknown ideal weight.). Lab Results Component Value Date/Time Sodium 139 10/19/2018 12:12 PM  
 Potassium 3.7 10/21/2018 11:04 AM  
 Chloride 105 10/19/2018 12:12 PM  
 CO2 23 10/19/2018 12:12 PM  
 Anion gap 11 10/19/2018 12:12 PM  
 Glucose 124 (H) 10/19/2018 12:12 PM  
 BUN 11 10/19/2018 12:12 PM  
 Creatinine 1.26 10/19/2018 12:12 PM  
 BUN/Creatinine ratio 9 (L) 10/19/2018 12:12 PM  
 GFR est AA >60 10/19/2018 12:12 PM  
 GFR est non-AA >60 10/19/2018 12:12 PM  
 Calcium 9.3 10/19/2018 12:12 PM  
 ALT (SGPT) 18 10/19/2018 12:12 PM  
 AST (SGOT) 17 10/19/2018 12:12 PM  
 Alk. phosphatase 74 10/19/2018 12:12 PM  
 Protein, total 8.1 10/19/2018 12:12 PM  
 Albumin 4.7 10/19/2018 12:12 PM  
 Globulin 3.4 10/19/2018 12:12 PM  
 A-G Ratio 1.4 10/19/2018 12:12 PM  
 Bilirubin, total 1.8 (H) 10/19/2018 12:12 PM  
 
 
Lab Results Component Value Date/Time Glucose 124 (H) 10/19/2018 12:12 PM  
 
 
Lab Results Component Value Date/Time Hemoglobin A1c 4.7 10/23/2018 04:42 AM  
 
 
Hematology Lab Results Component Value Date/Time WBC 7.7 10/19/2018 12:12 PM  
 HGB 16.5 10/19/2018 12:12 PM  
 HCT 45.7 10/19/2018 12:12 PM  
 PLATELET 141 74/81/7264 12:12 PM  
 MCV 78.1 (L) 10/19/2018 12:12 PM  
 
 
Lipids Lab Results Component Value Date/Time Cholesterol, total 106 10/23/2018 04:42 AM  
 HDL Cholesterol 45 10/23/2018 04:42 AM  
 LDL, calculated 46.4 10/23/2018 04:42 AM  
 Triglyceride 73 10/23/2018 04:42 AM  
 CHOL/HDL Ratio 2.4 10/23/2018 04:42 AM  
 
 
Thyroid Function Lab Results Component Value Date/Time TSH 1.55 10/24/2018 04:44 AM  
 
Vitals Visit Vitals /81 Pulse 89 Temp 97.5 °F (36.4 °C) Resp 18 SpO2 100% Yash Padron Pharm D Candidate 2019 
[phone] (pharmacy)

## 2018-10-23 NOTE — PROGRESS NOTES
Problem: Psychosis Goal: *STG: Decreased delusional thinking Outcome: Progressing Towards Goal 
Pt has had reasonable conversations with staff today. Med and diet compliant this shift. Denies SI, HI, hallucinations, and delusions. No concerns or pain or discomfort.

## 2018-10-24 PROBLEM — F32.A DEPRESSION: Status: ACTIVE | Noted: 2018-10-24

## 2018-10-24 LAB — TSH SERPL DL<=0.05 MIU/L-ACNC: 1.55 UIU/ML (ref 0.36–3.74)

## 2018-10-24 PROCEDURE — 65220000003 HC RM SEMIPRIVATE PSYCH

## 2018-10-24 PROCEDURE — 84443 ASSAY THYROID STIM HORMONE: CPT | Performed by: PSYCHIATRY & NEUROLOGY

## 2018-10-24 PROCEDURE — 74011250637 HC RX REV CODE- 250/637: Performed by: PSYCHIATRY & NEUROLOGY

## 2018-10-24 PROCEDURE — 36415 COLL VENOUS BLD VENIPUNCTURE: CPT | Performed by: PSYCHIATRY & NEUROLOGY

## 2018-10-24 RX ADMIN — ZOLPIDEM TARTRATE 10 MG: 10 TABLET ORAL at 21:45

## 2018-10-24 RX ADMIN — RISPERIDONE 1 MG: 1 TABLET ORAL at 21:45

## 2018-10-24 NOTE — BH NOTES
Pt was seen in treatment team this morning. Pt is alert and oriented. Pt denies SI/HI. Pt's mood is euthymic, affect is mood congruent. Pt's thought process is logical.  Pt's insight and judgment are fair, reliability is fair. Family updated on medication and progress. Family understands they will be visiting during regular visitation hours only. Social work department will continue to coordinate discharge plans.

## 2018-10-24 NOTE — PROGRESS NOTES
Problem: Psychosis Goal: *STG/LTG: Demonstrates improved thought patterns as evidenced by logical and coherent speech Outcome: Progressing Towards Goal 
Spoke to patient's mother and she was given an update on his current condition and transfer. Transferred to room 325 A with all belongings.

## 2018-10-24 NOTE — PROGRESS NOTES
Problem: Psychosis Goal: *STG/LTG: Demonstrates improved thought patterns as evidenced by logical and coherent speech Outcome: Progressing Towards Goal 
Pt rested quietly in bed with eyes closed. No signs/symptoms of distress, agitation, or anxiety. Will monitor for changes. Q 15 minute checks continue. Pt slept 5.5hrs

## 2018-10-24 NOTE — BH NOTES
GROUP THERAPY PROGRESS NOTE The patient Dahlia jones 26 y.o. male is participating in [employer]. Group time: 1 hour Personal goal for participation: To concentrate on selected task Goal orientation: social 
 
Group therapy participation: active Therapeutic interventions reviewed and discussed: Crafts, games, music Impression of participation: The patient was attentive. Berto Uriostegui 10/24/2018  6:00 PM

## 2018-10-24 NOTE — PROGRESS NOTES
Problem: Psychosis Goal: *STG: Decreased hallucinations Outcome: Progressing Towards Goal 
Alert and oriented. Denies Auditory or visual hallucinations. No distress noted. Calm and cooperative.

## 2018-10-24 NOTE — BH NOTES
During med pass of 0.50mg of Risperdal, one 0.25mg tablet fell onto the floor. Pt requested another. A 1 time dose of  0.25mg was ordered and given.

## 2018-10-24 NOTE — H&P
PSYCHIATRIC PROGRESS NOTE Patient Name  Lacey Callaway Date of Birth 1995 [facility] [id number] Medical Record Number  [id number] Age  26 y.o. PCP None Admit date:  10/19/2018 Room Number  325/01  @ [facility]  
Date of Service  10/24/2018 E & M PROGRESS NOTE:  
 
 
HISTORY  
   
REASON FOR HOSPITALIZATION: 
CC:  Pt admitted under a temporary [facility] order (TDO) with severe psychosis  proving to be an imminent danger to self. HISTORY OF PRESENT ILLNESS:   
The patient, Lacey Callaway, is a 26 y.o. WHITE [place]  male with a past psychiatric history significant for ? Bipolar D/O, who presents at this time with complaints of (and/or evidence of) the following emotional symptoms: delusions, psychotic behavior, anxiety and psychosis. Additional symptomatology include anxiety and fear of dying. The above symptoms have been present for 3 days. These symptoms are of high severity. These symptoms are constant in nature. The patient's condition has been precipitated by multiple psychosocial stressors. Patient's condition made worse by treatment noncompliance. UDS:negative; BAL=0. Pt was reportedly driving from [place] to [place], [place] to his family. He was on the phone with his grandfather who found him to be very delusional and facilitated his coming to [facility]. Pt presented very disorganized, anxious, bizarre. He has not slept for 3 days. He stated that on the way he wanted to stop by in [place] to visit his father's grave who  8 yrs ago in the month of October. His mother had called to report, she found a bottle of Celexa pills in his car. Pt denies any SI/HI. Pt reportedly is also stressed out from his new job. 10/21- Much improved from yesterday, better thought organization still some blocking and delayed response.  
10/22- patient seen in treatment team rounds; he provides a coherent but disorganized narrative about the events prior to admission. He acknowledges having left his apt in [place] without informing any family members or his roommate of his plans. 10/23- patient more organized, tolerated new medication. Improving insight re: events leading up to presentation 10/24- improving, insight improved (pt can state that his SI was more consistent with PI seen in psychosis). SIDE EFFECTS: (reviewed/updated 10/24/2018) None reported or admitted to. No noted toxicity with use of Depakote/Tegretol/lithium/Clozaril/TCAs ALLERGIES:(reviewed/updated 10/24/2018) No Known Allergies MEDICATIONS PRIOR TO ADMISSION:(reviewed/updated 10/24/2018) No medications prior to admission. PAST MEDICAL HISTORY: Past medical history from the initial psychiatric evaluation has been reviewed (reviewed/updated 10/24/2018) with no additional updates (I asked patient and no additional past medical history provided). Past Medical History:  
Diagnosis Date  [facility] No past surgical history on file. SOCIAL HISTORY: Social history from the initial psychiatric evaluation has been reviewed (reviewed/updated 10/24/2018) with no additional updates (I asked patient and no additional social history provided). Social History Socioeconomic History  Marital status: SINGLE Spouse name: Not on file  Number of children: Not on file  Years of education: Not on file  Highest education level: Not on file Social Needs  Financial resource strain: Not on file  Food insecurity - worry: Not on file  Food insecurity - inability: Not on file  Transportation needs - medical: Not on file  Transportation needs - non-medical: Not on file Occupational History  Not on file Tobacco Use  Smoking status: Current Every Day Smoker Substance and Sexual Activity  Alcohol use: Yes  Drug use: Yes Comment: did not specify  Sexual activity: Not on file Other Topics Concern  Not on file Social History Narrative  Not on file FAMILY HISTORY: Family history from the initial psychiatric evaluation has been reviewed (reviewed/updated 10/24/2018) with no additional updates (I asked patient and no additional family history provided). No family history on file. REVIEW OF SYSTEMS: (reviewed/updated 10/24/2018) Appetite:improved Sleep: improved All other Review of Systems: Psychological ROS: positive for - concentration difficulties 
negative for - hallucinations or suicidal ideation [facility] MENTAL STATUS EXAM (MSE): MSE FINDINGS ARE WITHIN NORMAL LIMITS (WNL) UNLESS OTHERWISE STATED BELOW. ( ALL OF THE BELOW CATEGORIES OF THE MSE HAVE BEEN REVIEWED (reviewed 10/24/2018) AND UPDATED AS DEEMED APPROPRIATE ) General Presentation age appropriate and casually dressed, cooperative and guarded Orientation oriented to time, place and person Vital Signs  See below (reviewed 10/24/2018); Vital Signs (BP, Pulse, & Temp) are within normal limits if not listed below. Gait and Station Stable/steady, no ataxia Musculoskeletal System No extrapyramidal symptoms (EPS); no abnormal muscular movements or Tardive Dyskinesia (TD); muscle strength and tone are within normal limits Language No aphasia or dysarthria Speech:  hypoverbal, monotone and soft Thought Processes logical; slow rate of thoughts; fair abstract reasoning/computation Thought Associations goal directed Thought Content free of hallucinations Suicidal Ideations none Homicidal Ideations none Mood:  anxious Affect:  constricted Memory recent  fair Memory remote:  good Concentration/Attention:  fair Fund of Knowledge average Insight:  fair Reliability fair Judgment:  good VITALS:    
Patient Vitals for the past 24 hrs: 
 Temp Pulse Resp BP SpO2  
10/24/18 0743 97.5 °F (36.4 °C) 89 18 129/81  10/23/18 2044 97.9 °F (36.6 °C) 84 16 142/86 100 % Wt Readings from Last 3 Encounters:  
10/19/18 68 kg (150 lb) Temp Readings from Last 3 Encounters:  
10/24/18 97.5 °F (36.4 °C)  
10/19/18 98.5 °F (36.9 °C) BP Readings from Last 3 Encounters:  
10/24/18 129/81  
10/19/18 (!) 135/93 Pulse Readings from Last 3 Encounters:  
10/24/18 89  
10/19/18 82 DATA LABORATORY DATA:(reviewed/updated 10/24/2018) Recent Results (from the past 24 hour(s)) TSH 3RD GENERATION Collection Time: 10/24/18  4:44 AM  
Result Value Ref Range TSH 1.55 0.36 - 3.74 uIU/mL No results found for: VALF2, VALAC, VALP, VALPR, DS6, CRBAM, CRBAMP, CARB2, XCRBAM 
No results found for: LITHM  
RADIOLOGY REPORTS:(reviewed/updated 10/24/2018) No results found. MEDICATIONS ALL MEDICATIONS:  
Current Facility-Administered Medications Medication Dose Route Frequency  risperiDONE (RisperDAL) tablet 1 mg  1 mg Oral QHS  ziprasidone (GEODON) 20 mg in sterile water (preservative free) 1 mL injection  20 mg IntraMUSCular BID PRN  
 OLANZapine (ZyPREXA) tablet 5 mg  5 mg Oral Q6H PRN  
 benztropine (COGENTIN) tablet 2 mg  2 mg Oral BID PRN  
 benztropine (COGENTIN) injection 2 mg  2 mg IntraMUSCular BID PRN  
 LORazepam (ATIVAN) injection 2 mg  2 mg IntraMUSCular Q4H PRN  
 LORazepam (ATIVAN) tablet 1 mg  1 mg Oral Q4H PRN  
 zolpidem (AMBIEN) tablet 10 mg  10 mg Oral QHS PRN  
 acetaminophen (TYLENOL) tablet 650 mg  650 mg Oral Q4H PRN  
 ibuprofen (MOTRIN) tablet 400 mg  400 mg Oral Q8H PRN  
 magnesium hydroxide (MILK OF MAGNESIA) 400 mg/5 mL oral suspension 30 mL  30 mL Oral DAILY PRN  
 nicotine (NICODERM CQ) 21 mg/24 hr patch 1 Patch  1 Patch TransDERmal DAILY PRN  
  
SCHEDULED MEDICATIONS:  
Current Facility-Administered Medications Medication Dose Route Frequency  risperiDONE (RisperDAL) tablet 1 mg  1 mg Oral QHS  
  
 
ASSESSMENT & PLAN  
 
 DIAGNOSES REQUIRING ACTIVE TREATMENT AND MONITORING: (reviewed/updated 10/24/2018) Patient Active Hospital Problem List: 
 Psychosis [facility]) (10/21/2018) Assessment: Still with disorganization. Concern for thought disorder vs MDD with psychosis. Start a strong agent with depot availability Plan: 
- CONTINUE Risperdal 1 mg QHS for psychosis In summary, Bella Porter, is a 26 y.o.  male who presents with a severe exacerbation of the principal diagnosis of Psychosis ([address] Patient's condition is improving. Patient requires continued inpatient hospitalization for further stabilization, safety monitoring and medication management. I will continue to coordinate the provision of individual, milieu, occupational, group, and substance abuse therapies to address target symptoms/diagnoses as deemed appropriate for the individual patient. A coordinated, multidisplinary treatment team round was conducted with the patient (this team consists of the nurse, psychiatric unit pharmcist,  and writer). Complete current electronic health record for patient has been reviewed today including consultant notes, ancillary staff notes, nurses and psychiatric tech notes. Suicide risk assessment completed and patient deemed to be of low risk for suicide at this time. The following regarding medications was addressed during rounds with patient:  
the risks and benefits of the proposed medication. The patient was given the opportunity to ask questions. Informed consent given to the use of the above medications. Will continue to adjust psychiatric and non-psychiatric medications (see above \"medication\" section and orders section for details) as deemed appropriate & based upon diagnoses and response to treatment. I will continue to order blood tests/labs and diagnostic tests as deemed appropriate and review results as they become available (see orders for details and above listed lab/test results). I will order psychiatric records from previous [facility] hospitals to further elucidate the nature of patient's psychopathology and review once available. I will gather additional collateral information from friends, family and o/p treatment team to further elucidate the nature of patient's psychopathology and baselline level of psychiatric functioning. I certify that this patient's inpatient psychiatric hospital services furnished since the previous certification were, and continue to be, required for treatment that could reasonably be expected to improve the patient's condition, or for diagnostic study, and that the patient continues to need, on a daily basis, active treatment furnished directly by or requiring the supervision of inpatient psychiatric facility personnel. In addition, the hospital records show that services furnished were intensive treatment services, admission or related services, or equivalent services. EXPECTED DISCHARGE DATE/DAY: 10/26/18 DISPOSITION: Home Signed By:  
Jose Szymanski MD 
10/24/2018

## 2018-10-24 NOTE — PROGRESS NOTES
Problem: Psychosis Goal: *STG/LTG: Complies with medication therapy Outcome: Progressing Towards Goal 
Pt was alert and visible on the unit. Pt shared a visit with is mom and grandmother. Pt was calm , cooperative, meal and medication compliant. Mo med/beh concerns. No expressed delusions. Staff will continue to monitor for health and safety.

## 2018-10-24 NOTE — BH NOTES
Patient has had visitors for the afternoon/evening. Visitors very social with him, protective. Patient affect is anxious; mood restless. compliant with medications & meals. Alert & oriented X4. Did request & receive Ambien 10 mg for sleep.

## 2018-10-24 NOTE — BH NOTES
PSYCHIATRIC PROGRESS NOTE Patient Name  Wicho Linda Date of Birth 1995 [facility] [id number] Medical Record Number  [id number] Age  26 y.o. PCP None Admit date:  10/19/2018 Room Number  325/01  @ [facility]  
Date of Service  10/24/2018 E & M PROGRESS NOTE:  
 
 
HISTORY  
   
REASON FOR HOSPITALIZATION: 
CC:  Pt admitted under a temporary senior care order (TDO) with severe psychosis  proving to be an imminent danger to self. HISTORY OF PRESENT ILLNESS:   
The patient, Wicho Linda, is a 26 y.o. WHITE [place]  male with a past psychiatric history significant for ? Bipolar D/O, who presents at this time with complaints of (and/or evidence of) the following emotional symptoms: delusions, psychotic behavior, anxiety and psychosis. Additional symptomatology include anxiety and fear of dying. The above symptoms have been present for 3 days. These symptoms are of high severity. These symptoms are constant in nature. The patient's condition has been precipitated by multiple psychosocial stressors. Patient's condition made worse by treatment noncompliance. UDS:negative; BAL=0. Pt [place] to [place], [place] to his family. He was on the phone with his grandfather who found him to be very delusional and facilitated his coming to [facility]. Pt presented very disorganized, anxious, bizarre. He has not slept for 3 days. He stated that on the way he wanted to stop by in [place] to visit his father's grave who  8 yrs ago in the month of October. His mother had called to report, she found a bottle of Celexa pills in his car. Pt denies any SI/HI. Pt reportedly is also stressed out from his new job. 10/21- Much improved from yesterday, better thought organization still some blocking and delayed response.  
10/22- patient seen in treatment team rounds; he provides a coherent but disorganized narrative about the events prior to admission. He acknowledges having left his apt in [facility]. without informing any family members or his roommate of his plans. 10/23- patient more organized, tolerated new medication. Improving insight re: events leading up to presentation 10/24- improving, insight improved (pt can state that his SI was more consistent with PI seen in psychosis). SIDE EFFECTS: (reviewed/updated 10/24/2018) None reported or admitted to. No noted toxicity with use of Depakote/Tegretol/lithium/Clozaril/TCAs ALLERGIES:(reviewed/updated 10/24/2018) No Known Allergies MEDICATIONS PRIOR TO ADMISSION:(reviewed/updated 10/24/2018) No medications prior to admission. PAST MEDICAL HISTORY: Past medical history from the initial psychiatric evaluation has been reviewed (reviewed/updated 10/24/2018) with no additional updates (I asked patient and no additional past medical history provided). Past Medical History:  
Diagnosis Date  [facility] No past surgical history on file. SOCIAL HISTORY: Social history from the initial psychiatric evaluation has been reviewed (reviewed/updated 10/24/2018) with no additional updates (I asked patient and no additional social history provided). Social History Socioeconomic History  Marital status: SINGLE Spouse name: Not on file  Number of children: Not on file  Years of education: Not on file  Highest education level: Not on file Social Needs  Financial resource strain: Not on file  Food insecurity - worry: Not on file  Food insecurity - inability: Not on file  Transportation needs - medical: Not on file  Transportation needs - non-medical: Not on file Occupational History  Not on file Tobacco Use  Smoking status: Current Every Day Smoker Substance and Sexual Activity  Alcohol use: Yes  Drug use: Yes Comment: did not specify  Sexual activity: Not on file Other Topics Concern  Not on file Social History Narrative  Not on file FAMILY HISTORY: Family history from the initial psychiatric evaluation has been reviewed (reviewed/updated 10/24/2018) with no additional updates (I asked patient and no additional family history provided). No family history on file. REVIEW OF SYSTEMS: (reviewed/updated 10/24/2018) Appetite:improved Sleep: improved All other Review of Systems: Psychological ROS: positive for - concentration difficulties 
negative for - hallucinations or suicidal ideation [facility] MENTAL STATUS EXAM (MSE): MSE FINDINGS ARE WITHIN NORMAL LIMITS (WNL) UNLESS OTHERWISE STATED BELOW. ( ALL OF THE BELOW CATEGORIES OF THE MSE HAVE BEEN REVIEWED (reviewed 10/24/2018) AND UPDATED AS DEEMED APPROPRIATE ) General Presentation age appropriate and casually dressed, cooperative and guarded Orientation oriented to time, place and person Vital Signs  See below (reviewed 10/24/2018); Vital Signs (BP, Pulse, & Temp) are within normal limits if not listed below. Gait and Station Stable/steady, no ataxia Musculoskeletal System No extrapyramidal symptoms (EPS); no abnormal muscular movements or Tardive Dyskinesia (TD); muscle strength and tone are within normal limits Language No aphasia or dysarthria Speech:  hypoverbal, monotone and soft Thought Processes logical; slow rate of thoughts; fair abstract reasoning/computation Thought Associations goal directed Thought Content free of hallucinations Suicidal Ideations none Homicidal Ideations none Mood:  anxious Affect:  constricted Memory recent  fair Memory remote:  good Concentration/Attention:  fair Fund of Knowledge average Insight:  fair Reliability fair Judgment:  good VITALS:    
Patient Vitals for the past 24 hrs: 
 Temp Pulse Resp BP SpO2  
10/24/18 0743 97.5 °F (36.4 °C) 89 18 129/81  10/23/18 2044 97.9 °F (36.6 °C) 84 16 142/86 100 % Wt Readings from Last 3 Encounters:  
10/19/18 68 kg (150 lb) Temp Readings from Last 3 Encounters:  
10/24/18 97.5 °F (36.4 °C)  
10/19/18 98.5 °F (36.9 °C) BP Readings from Last 3 Encounters:  
10/24/18 129/81  
10/19/18 (!) 135/93 Pulse Readings from Last 3 Encounters:  
10/24/18 89  
10/19/18 82 DATA LABORATORY DATA:(reviewed/updated 10/24/2018) Recent Results (from the past 24 hour(s)) TSH 3RD GENERATION Collection Time: 10/24/18  4:44 AM  
Result Value Ref Range TSH 1.55 0.36 - 3.74 uIU/mL No results found for: VALF2, VALAC, VALP, VALPR, DS6, CRBAM, CRBAMP, CARB2, XCRBAM 
No results found for: LITHM  
RADIOLOGY REPORTS:(reviewed/updated 10/24/2018) No results found. MEDICATIONS ALL MEDICATIONS:  
Current Facility-Administered Medications Medication Dose Route Frequency  risperiDONE (RisperDAL) tablet 1 mg  1 mg Oral QHS  ziprasidone (GEODON) 20 mg in sterile water (preservative free) 1 mL injection  20 mg IntraMUSCular BID PRN  
 OLANZapine (ZyPREXA) tablet 5 mg  5 mg Oral Q6H PRN  
 benztropine (COGENTIN) tablet 2 mg  2 mg Oral BID PRN  
 benztropine (COGENTIN) injection 2 mg  2 mg IntraMUSCular BID PRN  
 LORazepam (ATIVAN) injection 2 mg  2 mg IntraMUSCular Q4H PRN  
 LORazepam (ATIVAN) tablet 1 mg  1 mg Oral Q4H PRN  
 zolpidem (AMBIEN) tablet 10 mg  10 mg Oral QHS PRN  
 acetaminophen (TYLENOL) tablet 650 mg  650 mg Oral Q4H PRN  
 ibuprofen (MOTRIN) tablet 400 mg  400 mg Oral Q8H PRN  
 magnesium hydroxide (MILK OF MAGNESIA) 400 mg/5 mL oral suspension 30 mL  30 mL Oral DAILY PRN  
 nicotine (NICODERM CQ) 21 mg/24 hr patch 1 Patch  1 Patch TransDERmal DAILY PRN  
  
SCHEDULED MEDICATIONS:  
Current Facility-Administered Medications Medication Dose Route Frequency  risperiDONE (RisperDAL) tablet 1 mg  1 mg Oral QHS  
  
 
ASSESSMENT & PLAN  
 
 DIAGNOSES REQUIRING ACTIVE TREATMENT AND MONITORING: (reviewed/updated 10/24/2018) Patient Active Hospital Problem List: 
 Psychosis [facility]) (10/21/2018) Assessment: Still with disorganization. Concern for thought disorder vs MDD with psychosis. Start a strong agent with depot availability Plan: 
- CONTINUE Risperdal 1 mg QHS for psychosis In summary, Cherylene Pick, is a 26 y.o.  male who presents with a severe exacerbation of the principal diagnosis of Psychosis ([address] Patient's condition is improving. Patient requires continued inpatient hospitalization for further stabilization, safety monitoring and medication management. I will continue to coordinate the provision of individual, milieu, occupational, group, and substance abuse therapies to address target symptoms/diagnoses as deemed appropriate for the individual patient. A coordinated, multidisplinary treatment team round was conducted with the patient (this team consists of the nurse, psychiatric unit pharmcist,  and writer). Complete current electronic health record for patient has been reviewed today including consultant notes, ancillary staff notes, nurses and psychiatric tech notes. Suicide risk assessment completed and patient deemed to be of low risk for suicide at this time. The following regarding medications was addressed during rounds with patient:  
the risks and benefits of the proposed medication. The patient was given the opportunity to ask questions. Informed consent given to the use of the above medications. Will continue to adjust psychiatric and non-psychiatric medications (see above \"medication\" section and orders section for details) as deemed appropriate & based upon diagnoses and response to treatment. I will continue to order blood tests/labs and diagnostic tests as deemed appropriate and review results as they become available (see orders for details and above listed lab/test results). I will order psychiatric records from previous [facility] hospitals to further elucidate the nature of patient's psychopathology and review once available. I will gather additional collateral information from friends, family and o/p treatment team to further elucidate the nature of patient's psychopathology and baselline level of psychiatric functioning. I certify that this patient's inpatient psychiatric hospital services furnished since the previous certification were, and continue to be, required for treatment that could reasonably be expected to improve the patient's condition, or for diagnostic study, and that the patient continues to need, on a daily basis, active treatment furnished directly by or requiring the supervision of inpatient psychiatric facility personnel. In addition, the hospital records show that services furnished were intensive treatment services, admission or related services, or equivalent services. EXPECTED DISCHARGE DATE/DAY: 10/26/18 DISPOSITION: Home Signed By:  
Maribell Leyva MD 
10/24/2018

## 2018-10-24 NOTE — BH NOTES
GROUP THERAPY PROGRESS NOTE The patient Bella jones 26 y.o. male is participating in Reflections Group Group time: 30 minutes Personal goal for participation: To discuss the daily goals Goal orientation: personal 
 
Group therapy participation: {GROUP THERAPY PARTICIPATION:[id number]} Therapeutic interventions reviewed and discussed:  Yes Impression of participation:  HAJA Martinez 10/23/2018  10:27 PM

## 2018-10-25 VITALS
OXYGEN SATURATION: 100 % | RESPIRATION RATE: 18 BRPM | HEIGHT: 72 IN | HEART RATE: 72 BPM | BODY MASS INDEX: 20.32 KG/M2 | WEIGHT: 150 LBS | TEMPERATURE: 97.5 F | SYSTOLIC BLOOD PRESSURE: 155 MMHG | DIASTOLIC BLOOD PRESSURE: 100 MMHG

## 2018-10-25 RX ORDER — RISPERIDONE 1 MG/1
1 TABLET, FILM COATED ORAL
Qty: 14 TAB | Refills: 0 | Status: SHIPPED | OUTPATIENT
Start: 2018-10-25

## 2018-10-25 NOTE — DISCHARGE SUMMARY
PSYCHIATRIC DISCHARGE SUMMARY         IDENTIFICATION:    Patient Name  Cherylene Pick   Date of Birth 1995   [facility] [id number]   Medical Record Number  [id number]      Age  26 y.o. PCP None   Admit date:  10/19/2018    Discharge date: 10/25/2018   Room Number  325/01  @ [address]   Date of Service  10/25/2018            TYPE OF DISCHARGE: REGULAR               CONDITION AT DISCHARGE: improved and stable       PROVISIONAL & DISCHARGE DIAGNOSES:    Problem List  Never Reviewed          Codes Class    Depression ICD-10-CM: F32.9  ICD-9-CM: 832         Psych & behavrl factors assoc w disord or dis classd elswhr ICD-10-CM: F54  ICD-9-CM: 316         * (Principal) Psychosis ([facility] ICD-10-CM: F29  ICD-9-CM: 298.9         Bipolar 1 disorder ([facility] ICD-10-CM: F31.9  ICD-9-CM: 296.7               Active Hospital Problems    Depression      Psych & behavrl factors assoc w disord or dis classd elswhr      *Psychosis ([facility]        DISCHARGE DIAGNOSIS:   Axis I:  SEE ABOVE  Axis II: SEE ABOVE  Axis III: SEE ABOVE  Axis IV:  lack of structure  Axis V:  40 on admission, 60 on discharge 80 (baseline)       CC & HISTORY OF PRESENT ILLNESS:  \"psychosis\"    The Ayo Guthrie, is a 23 y. o.  WHITE OR  male with a past psychiatric history significant for ? Bipolar D/O, who presents at this time with complaints of (and/or evidence of) the following emotional symptoms: delusions, psychotic behavior, anxiety and psychosis.  Additional symptomatology include anxiety and fear of dying.  The above symptoms have been present for 3 days. These symptoms are of high severity. These symptoms are constant in nature.  The patient's condition has been precipitated by multiple psychosocial stressors.  Patient's condition made worse by treatment noncompliance. UDS:negative; BAL=0. Pt [facility] [place] to his family.  He was on the phone with his grandfather who found him to be very delusional and facilitated his coming to [facility]. Pt presented very disorganized, anxious, bizarre. He has not slept for 3 days. He stated that on the way he wanted to stop by in [place] to visit his father's grave who  8 yrs ago in the month of October. His mother had called to report, she found a bottle of Celexa pills in his car. Pt denies any SI/HI. Pt reportedly is also stressed out from his new job. 10/21- Much improved from yesterday, better thought organization still some blocking and delayed response. 10/22- patient seen in treatment team rounds; he provides a coherent but disorganized narrative about the events prior to admission. He acknowledges having left his apt in [place] without informing any family members or his roommate of his plans. 10/23- patient more organized, tolerated new medication. Improving insight re: events leading up to presentation  10/24- improving, insight improved (pt can state that his SI was more consistent with PI seen in psychosis). SOCIAL HISTORY:    Social History     Socioeconomic History    Marital status: SINGLE     Spouse name: Not on file    Number of children: Not on file    Years of education: Not on file    Highest education level: Not on file   Social Needs    Financial resource strain: Not on file    Food insecurity - worry: Not on file    Food insecurity - inability: Not on file    Transportation needs - medical: Not on file   [employer] needs - non-medical: Not on file   Occupational History    Not on file   Tobacco Use    Smoking status: Current Every Day Smoker   Substance and Sexual Activity    Alcohol use: Yes    Drug use: Yes     Comment: did not specify    Sexual activity: Not on file   Other Topics Concern    Not on file   Social History Narrative    Not on file      FAMILY HISTORY:   No family history on file.           HOSPITALIZATION COURSE:    Martina Mcmahon was admitted to the inpatient psychiatric unit [facility] for acute psychiatric stabilization in regards to symptomatology as described in the HPI above. The differential diagnosis at time of admission included: unspecified psychotic disorder vs schizophreniform disorder. While on the unit Elayne Marques was involved in individual, group, occupational and milieu therapy. Psychiatric medications were adjusted during this hospitalization including Risperdal.   Elayne Marques demonstrated a slow, but progressive improvement in overall condition. Much of patient's depression appeared to be related to situational stressors, effects of drugs of abuse, and psychological factors. Please see individual progress notes for more specific details regarding patient's hospitalization course. At time of discharge, Elayne Marques is without significant problems of depression psychosis or karel. Patient free of suicidal and homicidal ideations (appears to be at very low risk of suicide or homicide) and reports many positive predictive factors in terms of not attempting suicide or homicide. Overall presentation at time of discharge is most consistent with the diagnosis of schizophreniform disorder. Patient with request for discharge today. Patient has maximized benefit to be derived from acute inpatient psychiatric treatment. All members of the treatment team concur with each other in regards to plans for discharge today. Patient and family are aware and in agreement with discharge and discharge plan.          LABS AND IMAGAING:    Labs Reviewed   POTASSIUM   HEMOGLOBIN A1C WITH EAG   LIPID PANEL   TSH 3RD GENERATION     No results found for: DS35, PHEN, PHENO, PHENT, DILF, DS39, PHENY, PTN, VALF2, VALAC, VALP, VALPR, DS6, CRBAM, CRBAMP, CARB2, XCRBAM  Admission on 10/19/2018, Discharged on 10/25/2018   Component Date Value Ref Range Status    Potassium 10/21/2018 3.7  3.5 - 5.1 mmol/L Final    Hemoglobin A1c 10/23/2018 4.7  4.2 - 6.3 % Final    Est. average glucose 10/23/2018 Cannot be calculated  mg/dL Final    LIPID PROFILE 10/23/2018        Final    Cholesterol, total 10/23/2018 106  <200 MG/DL Final    Triglyceride 10/23/2018 73  <150 MG/DL Final    HDL Cholesterol 10/23/2018 45  MG/DL Final    LDL, calculated 10/23/2018 46.4  0 - 100 MG/DL Final    VLDL, calculated 10/23/2018 14.6  MG/DL Final    CHOL/HDL Ratio 10/23/2018 2.4  0 - 5.0   Final    TSH 10/24/2018 1.55  0.36 - 3.74 uIU/mL Final   Admission on 10/19/2018, Discharged on 10/19/2018   Component Date Value Ref Range Status    Sodium 10/19/2018 139  136 - 145 mmol/L Final    Potassium 10/19/2018 3.3* 3.5 - 5.1 mmol/L Final    Chloride 10/19/2018 105  97 - 108 mmol/L Final    CO2 10/19/2018 23  21 - 32 mmol/L Final    Anion gap 10/19/2018 11  5 - 15 mmol/L Final    Glucose 10/19/2018 124* 65 - 100 mg/dL Final    BUN 10/19/2018 11  6 - 20 MG/DL Final    Creatinine 10/19/2018 1.26  0.70 - 1.30 MG/DL Final    BUN/Creatinine ratio 10/19/2018 9* 12 - 20   Final    GFR est AA 10/19/2018 >60  >60 ml/min/1.73m2 Final    GFR est non-AA 10/19/2018 >60  >60 ml/min/1.73m2 Final    Calcium 10/19/2018 9.3  8.5 - 10.1 MG/DL Final    Bilirubin, total 10/19/2018 1.8* 0.2 - 1.0 MG/DL Final    ALT (SGPT) 10/19/2018 18  12 - 78 U/L Final    AST (SGOT) 10/19/2018 17  15 - 37 U/L Final    Alk.  phosphatase 10/19/2018 74  45 - 117 U/L Final    Protein, total 10/19/2018 8.1  6.4 - 8.2 g/dL Final    Albumin 10/19/2018 4.7  3.5 - 5.0 g/dL Final    Globulin 10/19/2018 3.4  2.0 - 4.0 g/dL Final    A-G Ratio 10/19/2018 1.4  1.1 - 2.2   Final    WBC 10/19/2018 7.7  4.1 - 11.1 K/uL Final    RBC 10/19/2018 5.85* 4.10 - 5.70 M/uL Final    HGB 10/19/2018 16.5  12.1 - 17.0 g/dL Final    HCT 10/19/2018 45.7  36.6 - 50.3 % Final    MCV 10/19/2018 78.1* 80.0 - 99.0 FL Final    MCH 10/19/2018 28.2  26.0 - 34.0 PG Final    MCHC 10/19/2018 36.1  30.0 - 36.5 g/dL Final    RDW 10/19/2018 12.8  11.5 - 14.5 % Final    PLATELET 22/27/7406 392  150 - 400 K/uL Final    MPV 10/19/2018 10.2  8.9 - 12.9 FL Final    NRBC 10/19/2018 0.0  0  WBC Final    ABSOLUTE NRBC 10/19/2018 0.00  0.00 - 0.01 K/uL Final    NEUTROPHILS 10/19/2018 68  32 - 75 % Final    LYMPHOCYTES 10/19/2018 21  12 - 49 % Final    MONOCYTES 10/19/2018 9  5 - 13 % Final    EOSINOPHILS 10/19/2018 1  0 - 7 % Final    BASOPHILS 10/19/2018 0  0 - 1 % Final    IMMATURE GRANULOCYTES 10/19/2018 0  0.0 - 0.5 % Final    ABS. NEUTROPHILS 10/19/2018 5.3  1.8 - 8.0 K/UL Final    ABS. LYMPHOCYTES 10/19/2018 1.6  0.8 - 3.5 K/UL Final    ABS. MONOCYTES 10/19/2018 0.7  0.0 - 1.0 K/UL Final    ABS. EOSINOPHILS 10/19/2018 0.1  0.0 - 0.4 K/UL Final    ABS. BASOPHILS 10/19/2018 0.0  0.0 - 0.1 K/UL Final    ABS. IMM. GRANS.  10/19/2018 0.0  0.00 - 0.04 K/UL Final    DF 10/19/2018 AUTOMATED    Final    AMPHETAMINES 10/19/2018 NEGATIVE   NEG   Final    BARBITURATES 10/19/2018 NEGATIVE   NEG   Final    BENZODIAZEPINES 10/19/2018 NEGATIVE   NEG   Final    COCAINE 10/19/2018 NEGATIVE   NEG   Final    METHADONE 10/19/2018 NEGATIVE   NEG   Final    OPIATES 10/19/2018 NEGATIVE   NEG   Final    PCP(PHENCYCLIDINE) 10/19/2018 NEGATIVE   NEG   Final    THC (TH-CANNABINOL) 10/19/2018 NEGATIVE   NEG   Final    Drug screen comment 10/19/2018 (NOTE)   Final    Color 10/19/2018 DARK YELLOW    Final    Appearance 10/19/2018 CLEAR  CLEAR   Final    Specific gravity 10/19/2018 1.013  1.003 - 1.030   Final    pH (UA) 10/19/2018 6.0  5.0 - 8.0   Final    Protein 10/19/2018 NEGATIVE   NEG mg/dL Final    Glucose 10/19/2018 NEGATIVE   NEG mg/dL Final    Ketone 10/19/2018 NEGATIVE   NEG mg/dL Final    Bilirubin 10/19/2018 NEGATIVE   NEG   Final    Blood 10/19/2018 NEGATIVE   NEG   Final    Urobilinogen 10/19/2018 1.0  0.2 - 1.0 EU/dL Final    Nitrites 10/19/2018 NEGATIVE   NEG   Final    Leukocyte Esterase 10/19/2018 NEGATIVE   NEG   Final    WBC 10/19/2018 0-4  0 - 4 /hpf Final    RBC 10/19/2018 0-5  0 - 5 /hpf Final    Epithelial cells 10/19/2018 FEW  FEW /lpf Final    Bacteria 10/19/2018 NEGATIVE   NEG /hpf Final    UA:UC IF INDICATED 10/19/2018 CULTURE NOT INDICATED BY UA RESULT  CNI   Final    Hyaline cast 10/19/2018 2-5  0 - 5 /lpf Final    ALCOHOL(ETHYL),SERUM 10/19/2018 <10  <10 MG/DL Final     No results found. DISPOSITION:    Home. Patient to f/u with psychiatric and psychotherapy appointments. Patient is to f/u with internist as directed. FOLLOW-UP CARE:    Activity as tolerated  Regular Diet  Wound Care: none needed. Follow-up Information     Follow up With Specialties Details [facility] - Dr. Freddie Trejo on 10/31/2018 Psychiatric medication management appointment on Wednesday, October 31st at 10:30 AM.  Address: [facility] [address] [place] [address]   Delano Anne     Phone: [phone]  Fax: [phone]    None    None (395) Patient stated that they have no PCP                   PROGNOSIS:   Good / [address] ---- based on nature of patient's pathology/ies and treatment compliance issues. Prognosis is greatly dependent upon patient's ability to remain sober and to follow up with psychiatric/psychotherapy appointments as well as to comply with psychiatric medications as prescribed. DISCHARGE MEDICATIONS:     Informed consent given for the use of following psychotropic medications:  Discharge Medication List as of 10/25/2018  1:04 PM      START taking these medications    Details   risperiDONE (RISPERDAL) 1 mg tablet Take 1 Tab by mouth nightly. , Print, Disp-14 Tab, R-0                    A coordinated, multidisplinary treatment team round was conducted with Lolita Das is done daily here at [facility]. This team consists of the nurse, psychiatric unit pharmcist,  and writer. I have spent greater than 35 minutes on discharge work.     Signed:  Jeaneth Orantes MD  10/25/2018

## 2018-10-25 NOTE — DISCHARGE INSTRUCTIONS
DISCHARGE SUMMARY from Nurse    PATIENT INSTRUCTIONS:    If I feel that I can not keep these promises and I am at risk of hurting myself or others, I will call the crisis office and speak with a crisis worker who will assist me during my crisis. [place] [facility]  [address] [facility]  [phone]  [place] [facility]  [phone]       What to do at Home:  Recommended activity: {discharge activity:05056}, ***         Preventing a Relapse of Depression: Care Instructions  Your Care Instructions    A relapse of depression means your symptoms have come back after you have gotten better. This illness often comes and goes during a lifetime. But there are many things you can do to keep it from coming back. Follow-up care is a key part of your treatment and safety. Be sure to make and go to all appointments, and call your doctor if you are having problems. It's also a good idea to know your test results and keep a list of the medicines you take. What do you need to know? Know your risk of relapse  Talk to your doctor to find out if you are at risk of relapse. Many things can make a person more likely to relapse into depression. These include having a family member with depression, dealing with serious problems in a relationship or a job, having a serious medical condition, or abusing drugs or alcohol. It is important to know your risk and to recognize warning signs of relapse. Once you know these things, you will be better able to keep it from happening to you. Know the warning signs of relapse  The two most common signs of relapse are:  · Feeling sad or hopeless. · Losing interest in your daily activities. You may have other symptoms, such as:  · You lose or gain weight. · You sleep too much or not enough. · You feel restless and unable to sit still. · You feel unable to move.   · You feel tired all the time.  · You feel unworthy or guilty without an obvious reason. · You have problems concentrating, remembering, or making decisions. · You think often about death or suicide. · You feel angry or have panic attacks. How can you care for yourself at home? · Take your medicine as prescribed. Call your doctor if you have any problems with your medicine. Many people take their medicines for at least 6 months after they have recovered. This often helps keep symptoms from coming back. However, if your depression keeps coming back, you may have to take medicine for the rest of your life. · Continue counseling even after you have stopped taking medicine. · Eat healthy foods. Include fruits, vegetables, beans, and whole grains in your diet each day. · Get at least 30 minutes of exercise on most days of the week. Walking is a good choice. You also may want to do other activities, such as running, swimming, cycling, or playing tennis or team sports. · See your doctor right away if you have new symptoms or feel that your depression is coming back. · Keep a regular sleep schedule. Try for 8 hours of sleep a night. · Avoid alcohol and illegal drugs. · Keep the numbers for these national suicide hotlines: 8-034-741-TALK (8-446.400.7009) and 2-626-TPVLQZX (4-806.281.7107). If you or someone you know talks about suicide or feeling hopeless, get help right away. When should you call for help? Call 911 anytime you think you may need emergency care.  For example, call if:    · You are thinking about suicide or are threatening suicide.     · You feel you cannot stop from hurting yourself or someone else.     · You hear or see things that aren't real.     · You think or speak in a bizarre way that is not like your usual behavior.    Call your doctor now or seek immediate medical care if:    · You are drinking a lot of alcohol or using illegal drugs.     · You are talking or writing about death.    Watch closely for changes in your health, and be sure to contact your doctor if:    · You find it hard or it's getting harder to deal with school, a job, family, or friends.     · You think your treatment is not helping or you are not getting better.     · Your symptoms get worse or you get new symptoms.     · You have any problems with your antidepressant medicines, such as side effects, or you are thinking about stopping your medicine.     · You are having manic behavior, such as having very high energy, needing less sleep than normal, or showing risky behavior such as spending money you don't have or abusing others verbally or physically. Where can you learn more? Go to http://scarlet-january.info/. Enter Z749 in the search box to learn more about \"Preventing a Relapse of Depression: Care Instructions. \"  Current as of: December 7, 2017  Content Version: 11.8  © 8202-4783 [employer]. Care instructions adapted under license by [employer] (which disclaims liability or warranty for this information). If you have questions about a medical condition or this instruction, always ask your healthcare professional. [address] any warranty or liability for your use of this information. *  Please give a list of your current medications to your Primary Care Provider. *  Please update this list whenever your medications are discontinued, doses are      changed, or new medications (including over-the-counter products) are added. *  Please carry medication information at all times in case of emergency situations. These are general instructions for a healthy lifestyle:    No smoking/ No tobacco products/ Avoid exposure to second hand smoke  Surgeon General's Warning:  Quitting smoking now greatly reduces serious risk to your health.     Obesity, smoking, and sedentary lifestyle greatly increases your risk for illness    A healthy diet, regular physical exercise & weight monitoring are important for maintaining a healthy lifestyle    You may be retaining fluid if you have a history of heart failure or if you experience any of the following symptoms:  Weight gain of 3 pounds or more overnight or 5 pounds in a week, increased swelling in our hands or feet or shortness of breath while lying flat in bed. Please call your doctor as soon as you notice any of these symptoms; do not wait until your next office visit. Recognize signs and symptoms of STROKE:    F-face looks uneven    A-arms unable to move or move unevenly    S-speech slurred or non-existent    T-time-call 911 as soon as signs and symptoms begin-DO NOT go       Back to bed or wait to see if you get better-TIME IS BRAIN. Warning Signs of HEART ATTACK     Call 911 if you have these symptoms:   Chest discomfort. Most heart attacks involve discomfort in the center of the chest that lasts more than a few minutes, or that goes away and comes back. It can feel like uncomfortable pressure, squeezing, fullness, or pain.  Discomfort in other areas of the upper body. Symptoms can include pain or discomfort in one or both arms, the back, neck, jaw, or stomach.  Shortness of breath with or without chest discomfort.  Other signs may include breaking out in a cold sweat, nausea, or lightheadedness. Don't wait more than five minutes to call 911 - MINUTES MATTER! Fast action can save your life. Calling 911 is almost always the fastest way to get lifesaving treatment. Emergency Medical Services staff can begin treatment when they arrive -- up to an hour sooner than if someone gets to the hospital by car. The discharge information has been reviewed with the {PATIENT PARENT GUARDIAN:55689}. The {PATIENT PARENT GUARDIAN:02573} verbalized understanding.   Discharge medications reviewed with the {Dishcarge meds reviewed AUPA:09606} and appropriate educational materials and side effects teaching were provided.   ___________________________________________________________________________________________________________________________________

## 2018-10-25 NOTE — BH NOTES
Pt was discharged from unit to parents. Pt has all belongings. No items were sent to the safe or were in storage. D/C paperwork was reviewed and questions were encouraged. All answers were answered appropriately. Script was signed and placed in folder.

## 2018-10-25 NOTE — BH NOTES
Patient was present for dinner in [employer] Trev[employer] group, however, stated he was calm; while jiggling his leg, and  Clenching his cheek area. BP was elevated; 155/100. Did request & receive Ambien for sleep with HS medications. Had parents visit. Answered questions re: medications. Encouraged patient to accept medication for anxiety if needed. Will continue to monitor patient's status & assess needs.

## 2018-10-25 NOTE — BH NOTES
Behavioral Health Transition Record to Provider Patient Name: Martina Mcmahon YOB: 1995 Medical Record Number: [id number] Date of Admission: 10/19/2018 Date of Discharge: 10/25/2018 Attending Provider: No att. providers found Discharging Provider: Kevin Benavides MD 
To contact this individual call [phone] and ask the  to page. If unavailable, ask to be transferred to [facility] Provider on call. [facility] Provider will be available on call 24/7 and during holidays. Primary Care Provider: None No Known Allergies Reason for Admission: Pt was disorganized and confused. Admission Diagnosis: Bipolar Disorder Psych & behavrl factors assoc w disord or dis classd elswhr Depression * No surgery found * Results for orders placed or performed during the hospital encounter of 10/19/18 POTASSIUM Result Value Ref Range Potassium 3.7 3.5 - 5.1 mmol/L  
HEMOGLOBIN A1C WITH EAG Result Value Ref Range Hemoglobin A1c 4.7 4.2 - 6.3 % Est. average glucose Cannot be calculated mg/dL LIPID PANEL Result Value Ref Range LIPID PROFILE Cholesterol, total 106 <200 MG/DL Triglyceride 73 <150 MG/DL  
 HDL Cholesterol 45 MG/DL  
 LDL, calculated 46.4 0 - 100 MG/DL VLDL, calculated 14.6 MG/DL  
 CHOL/HDL Ratio 2.4 0 - 5.0 TSH 3RD GENERATION Result Value Ref Range TSH 1.55 0.36 - 3.74 uIU/mL Immunizations administered during this encounter:  
Immunization History Administered Date(s) Administered  Influenza Vaccine (Quad) PF 10/20/2018 Screening for Metabolic Disorders for Patients on Antipsychotic Medications 
(Data obtained from the EMR) Estimated Body Mass Index Estimated body mass index is 20.34 kg/m² as calculated from the following: 
  Height as of this encounter: 6' (1.829 m). Weight as of this encounter: 68 kg (150 lb). Vital Signs/Blood Pressure Visit Vitals BP (!) 155/100 (BP 1 Location: Right arm, BP Patient Position: Sitting) Pulse 72 Temp 97.5 °F (36.4 °C) Resp 18 Ht 6' (1.829 m) Wt 68 kg (150 lb) SpO2 100% BMI 20.34 kg/m² Blood Glucose/Hemoglobin A1c Lab Results Component Value Date/Time Glucose 124 (H) 10/19/2018 12:12 PM  
 
Lab Results Component Value Date/Time Hemoglobin A1c 4.7 10/23/2018 04:42 AM  
 
  
Lipid Panel Lab Results Component Value Date/Time Cholesterol, total 106 10/23/2018 04:42 AM  
 HDL Cholesterol 45 10/23/2018 04:42 AM  
 LDL, calculated 46.4 10/23/2018 04:42 AM  
 Triglyceride 73 10/23/2018 04:42 AM  
 CHOL/HDL Ratio 2.4 10/23/2018 04:42 AM  
 
  
Discharge Diagnosis: Psychosis Discharge Plan: Pt will return to [place] with his parents. He will follow-up with a new provider in [place]. Pt was alert and oriented. Pt denies SI/HI. Pt is free of delusions. Pt's thought process was logical. Pt's insight and judgment was fair. Pt is in agreement with the plan. Discharge Medication List and Instructions:  
Discharge Medication List as of 10/25/2018  1:04 PM  
  
START taking these medications Details  
risperiDONE (RISPERDAL) 1 mg tablet Take 1 Tab by mouth nightly. , Print, Disp-14 Tab, R-0 Unresulted Labs (24h ago, onward) None To obtain results of studies pending at discharge, please contact N/A Follow-up Information Follow up With Specialties Details Why Contact Info Comfort Torres  Go on 10/31/2018 Psychiatric medication management appointment on Wednesday, October 31st at 10:30 AM.  Address: [facility] [address] Phone: [phone] Fax: [phone] None    None ([phone]) Patient stated that they have no PCP Advanced Directive:  
Does the patient have an appointed surrogate decision maker? Yes Does the patient have a Medical Advance Directive?  No 
 Does the patient have a Psychiatric Advance Directive? No 
If the patient does not have a surrogate or Medical Advance Directive AND Psychiatric Advance Directive, the patient was offered information on these advance directives Patient will complete at a later time Patient Instructions: Please continue all medications until otherwise directed by physician. Review discharge paperwork Tobacco Cessation Discharge Plan:  
Is the patient a smoker and needs referral for smoking cessation? Yes Patient referred to the following for smoking cessation with an appointment? No    
Patient was offered medication to assist with smoking cessation at discharge? No 
Was education for smoking cessation added to the discharge instructions? No 
 
Alcohol/Substance Abuse Discharge Plan:  
Does the patient have a history of substance/alcohol abuse and requires a referral for treatment? Yes Patient referred to the following for substance/alcohol abuse treatment with an appointment? Yes Patient was offered medication to assist with alcohol cessation at discharge? No 
Was education for substance/alcohol abuse added to discharge instructions? Yes Patient discharged to Home; provided to the patient/caregiver either in hard copy or electronically.

## 2018-10-25 NOTE — PROGRESS NOTES
Diet as tolerated. Double portions ordered to help meet ~ needs. Pt noted to be meal compliant. Hx unremarkable per nutrition. BMI low/normal. 
Ht: 6' Wt: 150 lb BMI 20.3 kg/(m^2) c/w normal weight. Est energy needs: 2250 kcal, 72 g protein, 1 mL/kcal fluids Pt will consume > 75% of meals at follow up

## 2018-10-25 NOTE — BH NOTES
Pt observed resting quietly, respirations even and unlabored. No s/s distress noted, no complaints voiced. Pt slept 7 hours. Will continue Q 15 minute monitoring for safety.